# Patient Record
Sex: MALE | Race: WHITE
[De-identification: names, ages, dates, MRNs, and addresses within clinical notes are randomized per-mention and may not be internally consistent; named-entity substitution may affect disease eponyms.]

---

## 2017-04-03 ENCOUNTER — HOSPITAL ENCOUNTER (OUTPATIENT)
Dept: HOSPITAL 56 - MW.ED | Age: 47
Setting detail: OBSERVATION
LOS: 1 days | Discharge: HOME | End: 2017-04-04
Attending: INTERNAL MEDICINE | Admitting: INTERNAL MEDICINE
Payer: COMMERCIAL

## 2017-04-03 DIAGNOSIS — K21.9: ICD-10-CM

## 2017-04-03 DIAGNOSIS — K59.09: ICD-10-CM

## 2017-04-03 DIAGNOSIS — E78.00: ICD-10-CM

## 2017-04-03 DIAGNOSIS — Z79.82: ICD-10-CM

## 2017-04-03 DIAGNOSIS — I10: ICD-10-CM

## 2017-04-03 DIAGNOSIS — J45.901: Primary | ICD-10-CM

## 2017-04-03 DIAGNOSIS — F17.220: ICD-10-CM

## 2017-04-03 LAB
CHLORIDE SERPL-SCNC: 113 MMOL/L (ref 98–110)
SODIUM SERPL-SCNC: 142 MMOL/L (ref 136–146)

## 2017-04-03 PROCEDURE — 71020: CPT

## 2017-04-03 PROCEDURE — 84484 ASSAY OF TROPONIN QUANT: CPT

## 2017-04-03 PROCEDURE — 80048 BASIC METABOLIC PNL TOTAL CA: CPT

## 2017-04-03 PROCEDURE — 93005 ELECTROCARDIOGRAM TRACING: CPT

## 2017-04-03 PROCEDURE — 96361 HYDRATE IV INFUSION ADD-ON: CPT

## 2017-04-03 PROCEDURE — G0378 HOSPITAL OBSERVATION PER HR: HCPCS

## 2017-04-03 PROCEDURE — 96375 TX/PRO/DX INJ NEW DRUG ADDON: CPT

## 2017-04-03 PROCEDURE — 96376 TX/PRO/DX INJ SAME DRUG ADON: CPT

## 2017-04-03 PROCEDURE — 96365 THER/PROPH/DIAG IV INF INIT: CPT

## 2017-04-03 PROCEDURE — 94664 DEMO&/EVAL PT USE INHALER: CPT

## 2017-04-03 PROCEDURE — 99285 EMERGENCY DEPT VISIT HI MDM: CPT

## 2017-04-03 PROCEDURE — 80053 COMPREHEN METABOLIC PANEL: CPT

## 2017-04-03 PROCEDURE — 85025 COMPLETE CBC W/AUTO DIFF WBC: CPT

## 2017-04-03 PROCEDURE — 94640 AIRWAY INHALATION TREATMENT: CPT

## 2017-04-03 PROCEDURE — 36415 COLL VENOUS BLD VENIPUNCTURE: CPT

## 2017-04-03 RX ADMIN — FLUTICASONE PROPIONATE SCH SPRAY: 50 SPRAY, METERED NASAL at 21:01

## 2017-04-03 NOTE — PCM.HP
H&P History of Present Illness





- General


Date of Service: 04/03/17


Admit Problem/Dx: 


 Admission Diagnosis/Problem





Admission Diagnosis/Problem      Asthma without status asthmaticus








Source of Information: Patient, Family (Wife Erin at bedside)


History Limitations: Reports: No limitations





- History of Present Illness


Initial Comments - Free Text/Narative: 


This 46 year old male with pmh of asthma, environmental allergies, and HTN 

presented to the ED today with complaints of 4 day history of SOB, cough, and 

sinus congestion. It has progressively worsened and his wife, Erin, urged him 

to be evaluated. He denies fever, chills, chest pain or palpitations. No N/V or 

abdominal pain. He takes Singulair, Dulera, Albuterol inhaler PRN, and Flonase 

occasionally. He reports being in [place] has worsened his allergies significantly 

than home in the [facility]. His contract ends in June and is hoping to move back 

home. 





In the ED no leukocytosis noted, eosinophils elevated, 12.7. BMP WNL. CXR 

negative. He was noted to have mild hypoxia on RA, with wheezing throughout. He 

was treated with Duonebs, Solumedrol, and Magnesium IV. He will be admitted for 

asthma exacerbation and mild hypoxia.








- Related Data


Allergies/Adverse Reactions: 


 Allergies











Allergy/AdvReac Type Severity Reaction Status Date / Time


 


No Known Allergies Allergy   Verified 04/03/17 10:44











Home Medications: 


 Home Meds





Albuterol [Ventolin HFA] 2 inh INH BID 04/03/17 [History]


Albuterol/Ipratropium [DuoNeb 3.0-0.5 MG/3 ML] 1 ampule PO ASDIRECTED 04/03/17 [

History]


Losartan/Hydrochlorothiazide [Losartan-HCTZ 50-12.5 MG] 1 tab PO DAILY 04/03/17 

[History]


Mometasone/Formoterol [Dulera 200-5 MCG] 2 inh PO BID 04/03/17 [History]


Montelukast [Singulair] 1 tab PO DAILY 04/03/17 [History]











Past Medical History


Cardiovascular History: Reports: High cholesterol, Hypertension.  Denies: Afib, 

Blood clots/VTE/DVT, MI


Respiratory History: Reports: Asthma.  Denies: PE, Sleep apnea


Gastrointestinal History: Reports: Chronic constipation, GERD.  Denies: 

Gastritis, GI bleed


Endocrine/Metabolic History: Reports: Obesity/BMI 30+.  Denies: Diabetes, type 

II, Hypothyroidism





- Infectious Disease History


Infectious Disease History: Reports: Chicken pox





- Past Surgical History


Musculoskeletal Surgical History: Reports: Other (see below)


Other Musculoskeletal Surgeries/Procedures:: Right arm bicep muscle reattachment





Social & Family History





- Family History


Family Medical History: Noncontributory





- Tobacco Use


Smoking Status *Q: Never Smoker


Tobacco Use Within Last Twelve Months: Smokeless Tobacco


Packs/Tins Daily: 1 (1 tin per day)


Second Hand Smoke Exposure: Yes





- Tobacco Core Measures


Tobacco Use/Smoking Within Last 30 Days: Yes


Smoking Frequency Within Last 30 Days: Reports: None


Smokeless Tobacco Use in Last 30 Days: Yes


Smokeless Tobacco Use History: Chewing Tobacco





- Caffeine Use


Caffeine Use: Reports: Soda





- Alcohol Use


Alcohol Use Frequency: Socially





- Recreational Drug Use


Recreational Drug Use: No





- Living Situation & Occupation


Living situation: Reports: 


Occupation: employed





H&P Review of Systems





- Review of Systems:


Review Of Systems: See Below


General: Reports: no symptoms.  Denies: fever, chills, weakness


HEENT: Reports: sinus congestion, other (itchy ears)


Pulmonary: Reports: Shortness of Breath, Wheezing, Pleuritic Chest Pain, Cough.

  Denies: Sputum


Cardiovascular: Reports: no symptoms.  Denies: chest pain, palpitations, edema


Gastrointestinal: Reports: No symptoms, Constipation (chronic).  Denies: Black 

stool, Bloody stool, Nausea, Vomiting


Genitourinary: Reports: no symptoms.  Denies: dysuria, frequency, burning, pain


Musculoskeletal: Reports: no symptoms


Skin: Reports: no symptoms


Psychiatric: Reports: no symptoms


Neurological: Reports: No Symptoms


Hematologic/Lymphatic: Reports: no symptoms


Immunologic: Reports: no symptoms





Exam





- Exam


Exam: See Below





- Vital Signs


Vital Signs: 


 Last Vital Signs











Temp  98.2 F   04/03/17 14:15


 


Pulse  101 H  04/03/17 14:15


 


Resp  20   04/03/17 14:15


 


BP  145/92 H  04/03/17 14:15


 


Pulse Ox  92 L  04/03/17 14:15











Weight: 127.006 kg





- Exam


General: alert, oriented, cooperative


HEENT: Conjunctiva clear, EACs clear, EOMI, Hearing intact, Mucosa moist & pink

, Posterior pharynx clear.  No: Nares patent (sinus congestion)


Neck: supple, trachea midline, 2+ carotid pulse wo bruit


Lungs: Decreased breath sounds, Rhonchi, Wheezing


Cardiovascular: regular rate, regular rhythm, normal S1, normal S2


Abdomen: normal bowel sounds, soft.  No: organomegaly


Extremities: normal inspection, normal pulses


Neuro Extensive - Mental Status: alert, oriented x3, normal mood/affect, normal 

cognition


Psychiatric: alert, normal affect, normal mood





- Patient Data


Result Diagrams: 


 04/03/17 11:16





 04/03/17 11:16





*Q Meaningful Use (ADM)





- VTE *Q


VTE Criteria *Q: 








- VTE Risk Assess *Q


Each Risk Factor Represents 1 Point: Age 41 - 59 years


Total Score 1 Point Risk Factors: 1


Each Risk Factor Represents 2 Points: None


Total Score 2 Point Risk Factors: 0


Each Risk Factor Represents 3 Points: None


Total Score 3 Point Risk Factors: 0


Each Risk Factor Represents 5 Points: None


Total Score 5 Point Risk Factors: 0


Venous Thromboembolism Risk Factor Score *Q: 1





- Stroke *Q


Stroke Criteria *Q: 








- AMI *Q


AMI Criteria *Q: 








- Problem List


(1) Exacerbation of asthma


SNOMED Code(s): 841922686


   ICD Code: J45.901 - UNSPECIFIED ASTHMA WITH (ACUTE) EXACERBATION   Status: 

Acute   Current Visit: Yes   





(2) Environmental and seasonal allergies


SNOMED Code(s): 057445759, 933220094, 636246279


   ICD Code: J30.89 - OTHER ALLERGIC RHINITIS   Status: Acute   Current Visit: 

Yes   





(3) HTN (hypertension)


SNOMED Code(s): 72782581


   ICD Code: I10 - ESSENTIAL (PRIMARY) HYPERTENSION   Status: Chronic   Current 

Visit: Yes   


Qualifiers: 


   Hypertension type: essential hypertension   Qualified Code(s): I10 - 

Essential (primary) hypertension   


Problem List Initiated/Reviewed/Updated: Yes


Orders Last 24hrs: 


 Medication Orders





Sodium Chloride (Normal Saline)  250 mls @ 999 mls/hr IV STAT HENNA








Assessment/Plan Comment:: 





This 46 year old male admitted with asthma exacerbation with mild hypoxia





1. Asthma exacerbation iwth mild hypoxia: Continue IV Solu-medrol  mg 

Q8hrs. Duonebs, oxygen as needed. Continue Singulair and Flonase. Will add 

Loratadine. 





2. HTN: Continue Losartan/HCTZ





VTE: SCDs





Dispo: 1-2 days pending improvement.

## 2017-04-03 NOTE — EDM.PDOC
<Eddie Jo - Last Filed: 04/03/17 12:38>





ED HISTORY OF PRESENT ILLNESS





- General


Chief Complaint: Respiratory Problem


Stated Complaint: ASHMA


Time Seen by Provider: 04/03/17 10:53





- Related Data


Allergies/ADRs: 


 Allergies











Allergy/AdvReac Type Severity Reaction Status Date / Time


 


No Known Allergies Allergy   Verified 04/03/17 10:44











Home Meds: 


 Home Meds





Albuterol [Ventolin HFA] 2 inh INH BID 04/03/17 [History]


Albuterol/Ipratropium [DuoNeb 3.0-0.5 MG/3 ML] 1 ampule PO ASDIRECTED 04/03/17 [

History]


Losartan/Hydrochlorothiazide [Losartan-HCTZ 50-12.5 MG] 1 tab PO DAILY 04/03/17 

[History]


Mometasone/Formoterol [Dulera 200-5 MCG] 2 inh PO BID 04/03/17 [History]


Montelukast [Singulair] 1 tab PO DAILY 04/03/17 [History]











Course





- Vital Signs


Last Recorded V/S: 


 Last Vital Signs











Temp  36.5 C   04/03/17 11:41


 


Pulse  97   04/03/17 13:35


 


Resp  16   04/03/17 11:41


 


BP  150/72 H  04/03/17 13:35


 


Pulse Ox  88 L  04/03/17 13:35














- Orders/Labs/Meds


Orders: 


 Active Orders 24 hr











 Category Date Time Status


 


 RT Aerosol Therapy [RC] ASDIRECTED Care  04/03/17 11:40 Active


 


 Magnesium Sulfate/Water [Magnesium Sulfate 2 GM in Med  04/03/17 12:48 Active





 Water 50 ML] 2 gm   





 Premix Bag 1 bag   





 IV ONETIME   








 Medication Orders





Magnesium Sulfate 2 gm/ Premix  50 mls @ 25 mls/hr IV ONETIME ONE


   Stop: 04/03/17 14:47


   Last Admin: 04/03/17 13:01  Dose: 25 mls/hr








Labs: 


 Laboratory Tests











  04/03/17 04/03/17 Range/Units





  11:16 11:16 


 


WBC  7.09   (4.0-11.0)  K/uL


 


RBC  4.68   (4.50-5.90)  M/uL


 


Hgb  14.6   (13.0-17.0)  g/dL


 


Hct  42.8   (38.0-50.0)  %


 


MCV  91.5   (80.0-98.0)  fL


 


MCH  31.2   (27.0-32.0)  pg


 


MCHC  34.1   (31.0-37.0)  g/dL


 


RDW Std Deviation  47.0   (28.0-62.0)  fl


 


RDW Coeff of Walter  14   (11.0-15.0)  %


 


Plt Count  224   (150-400)  K/uL


 


MPV  9.60   (7.40-12.00)  fL


 


Neut % (Auto)  69.7   (48.0-80.0)  %


 


Lymph % (Auto)  12.7 L   (16.0-40.0)  %


 


Mono % (Auto)  3.8   (0.0-15.0)  %


 


Eos % (Auto)  12.7 H   (0.0-7.0)  %


 


Baso % (Auto)  1.1   (0.0-1.5)  %


 


Neut # (Auto)  4.9   (1.4-5.7)  K/uL


 


Lymph # (Auto)  0.9   (0.6-2.4)  K/uL


 


Mono # (Auto)  0.3   (0.0-0.8)  K/uL


 


Eos # (Auto)  0.9 H   (0.0-0.7)  K/uL


 


Baso # (Auto)  0.1   (0.0-0.1)  K/uL


 


Nucleated RBC %  0.0   /100WBC


 


Nucleated RBCs #  0   K/uL


 


Sodium   142  (136-146)  mmol/L


 


Potassium   4.2  (3.5-5.1)  mmol/L


 


Chloride   113 H  ()  mmol/L


 


Carbon Dioxide   19 L  (21-31)  mmol/L


 


BUN   22  (6.0-23.0)  mg/dL


 


Creatinine   1.1  (0.6-1.5)  mg/dL


 


Est Cr Clr Drug Dosing   88.42  mL/min


 


Estimated GFR (MDRD)   > 60.0  ml/min


 


Glucose   104  ()  mg/dL


 


Calcium   8.8  (8.8-10.8)  mg/dL


 


Total Bilirubin   0.9  (0.1-1.5)  mg/dL


 


AST   29  (5-40)  IU/L


 


ALT   66 H  (8-54)  IU/L


 


Alkaline Phosphatase   83  ()  


 


Total Protein   6.8  (6.0-8.0)  g/dL


 


Albumin   4.1  (3.5-5.0)  g/dL


 


Globulin   2.7  (2.0-3.5)  g/dL


 


Albumin/Globulin Ratio   1.5  (1.3-2.8)  











Meds: 


Medications











Generic Name Dose Route Start Last Admin





  Trade Name Freq  PRN Reason Stop Dose Admin


 


Magnesium Sulfate 2 gm/ Premix  50 mls @ 25 mls/hr  04/03/17 12:48  04/03/17 13:

01





  IV  04/03/17 14:47  25 mls/hr





  ONETIME ONE   Administration














Discontinued Medications














Generic Name Dose Route Start Last Admin





  Trade Name Freq  PRN Reason Stop Dose Admin


 


Sodium Chloride  1,000 mls @ 999 mls/hr  04/03/17 10:52  04/03/17 11:08





  Normal Saline  IV  04/03/17 11:52  999 mls/hr





  STAT ONE   Administration


 


Methylprednisolone Sodium Succinate  125 mg  04/03/17 10:52  04/03/17 11:07





  Solu-Medrol  IVPUSH  04/03/17 10:53  125 mg





  ONETIME ONE   Administration


 


Racepinephrine  0.5 ml  04/03/17 11:40  04/03/17 11:46





  S-2 2.25%  NEB  04/03/17 11:41  0.5 ml





  ONETIME ONE   Administration














Departure





- Departure


Disposition: Home, Self-Care 01


Clinical Impression: 


 Exacerbation of asthma





Instructions:  Asthma, Adult


Referrals: 


PCP,None [Primary Care Provider] - 


Forms:  ED Department Discharge


Additional Instructions: 


The following information is given to patients seen in the emergency department 

who are being discharged to home. This information is to outline your options 

for follow-up care. We provide all patients seen in our emergency department 

with a follow-up referral.





The need for follow-up, as well as the timing and circumstances, are variable 

depending upon the specifics of your emergency department visit.





If you don't have a primary care physician on staff, we will provide you with a 

referral. We always advise you to contact your personal physician following an 

emergency department visit to inform them of the circumstance of the visit and 

for follow-up with them and/or the need for any referrals to a consulting 

specialist.





The emergency department will also refer you to a specialist when appropriate. 

This referral assures that you have the opportunity for follow-up care with a 

specialist. All of these measure are taken in an effort to provide you with 

optimal care, which includes your follow-up.





Under all circumstances we always encourage you to contact your private 

physician who remains a resource for coordinating your care. When calling for 

follow-up care, please make the office aware that this follow-up is from your 

recent emergency room visit. If for any reason you are refused follow-up, 

please contact the [facility] Emergency 

Department at [phone] and asked to speak to the emergency department 

charge nurse.





Take your routine asthma medicine as directed





Followup with your primary care provider one to 2 days








There will be benefit for a pulmonology workup as well as an allergist








Return to ED as needed as discussed





- My Orders


Last 24 Hours: 


My Active Orders





04/03/17 11:40


RT Aerosol Therapy [RC] ASDIRECTED 














- Assessment/Plan


Last 24 Hours: 


My Active Orders





04/03/17 11:40


RT Aerosol Therapy [RC] ASDIRECTED 














<Leroy Maloney - Last Filed: 04/03/17 13:59>





ED HISTORY OF PRESENT ILLNESS





- General


Source of Information: Reports: Patient, Family


History Limitations: Reports: No limitations





- History of Present Illness


INITIAL COMMENTS - FREE TEXT/NARRATIVE: 


History of present illness:


[51-year-old male presenting with acute onset of shortness of breath. Patient 

has a long-term history of asthma with a reactive airway component and was 

previously seen at [facility] clinic with insufficient relief. Provider at [facility] 

called and requested we evaluate and treat patient further due to their limited 

resources.]





Review of systems: 


As per history of present illness and below otherwise all systems reviewed and 

negative.





Past medical history: 


As per history of present illness and as reviewed below otherwise 

noncontributory.





Surgical history: 


As per history of present illness and as reviewed below otherwise 

noncontributory.





Social history: 


No reported history of drug or alcohol abuse.





Family history: 


As per history of present illness and as reviewed below otherwise 

noncontributory.





Physical exam:


HEENT: Atraumatic, normocephalic, pupils reactive, negative for conjunctival 

pallor or scleral icterus, mucous membranes moist, throat clear, neck supple, 

nontender, trachea midline.


Lungs: Clear to auscultation, breath sounds equal bilaterally, chest nontender.


Heart: S1S2, regular, negative for clicks, rubs, or JVD.


Abdomen: Soft, nondistended, nontender. Negative for masses or 

hepatosplenomegaly. Negative for costovertebral tenderness.


Pelvis: Stable nontender.


Genitourinary: Deferred.


Rectal: Deferred.


Extremities: Atraumatic, negative for cords or calf pain. Neurovascular 

unremarkable.


Neuro: Awake, alert, oriented. Cranial nerves II through XII unremarkable. 

Cerebellum unremarkable. Motor and sensory unremarkable throughout. Exam 

nonfocal.





Diagnostics:


[Chest x-ray, CBC, CMP]





Therapeutics:


[Syncope, magnesium, IV fluid, some lateral]





Impression: 


[Asthma exacerbation]





Plan:


[Continue on asthma meds]





Definitive disposition and diagnosis as appropriate pending reevaluation and 

review of above.











ED ROS GENERAL





- Review of Systems


Review Of Systems: See Below (History of present illness)





ED EXAM, GENERAL





- Physical Exam


Exam: See Below (See history of present illness)





Course





- Vital Signs


Last Recorded V/S: 


 Last Vital Signs











Temp  36.5 C   04/03/17 11:41


 


Pulse  97   04/03/17 13:35


 


Resp  16   04/03/17 11:41


 


BP  150/72 H  04/03/17 13:35


 


Pulse Ox  88 L  04/03/17 13:35














- Orders/Labs/Meds


Labs: 


 Laboratory Tests











  04/03/17 04/03/17 Range/Units





  11:16 11:16 


 


WBC  7.09   (4.0-11.0)  K/uL


 


RBC  4.68   (4.50-5.90)  M/uL


 


Hgb  14.6   (13.0-17.0)  g/dL


 


Hct  42.8   (38.0-50.0)  %


 


MCV  91.5   (80.0-98.0)  fL


 


MCH  31.2   (27.0-32.0)  pg


 


MCHC  34.1   (31.0-37.0)  g/dL


 


RDW Std Deviation  47.0   (28.0-62.0)  fl


 


RDW Coeff of Walter  14   (11.0-15.0)  %


 


Plt Count  224   (150-400)  K/uL


 


MPV  9.60   (7.40-12.00)  fL


 


Neut % (Auto)  69.7   (48.0-80.0)  %


 


Lymph % (Auto)  12.7 L   (16.0-40.0)  %


 


Mono % (Auto)  3.8   (0.0-15.0)  %


 


Eos % (Auto)  12.7 H   (0.0-7.0)  %


 


Baso % (Auto)  1.1   (0.0-1.5)  %


 


Neut # (Auto)  4.9   (1.4-5.7)  K/uL


 


Lymph # (Auto)  0.9   (0.6-2.4)  K/uL


 


Mono # (Auto)  0.3   (0.0-0.8)  K/uL


 


Eos # (Auto)  0.9 H   (0.0-0.7)  K/uL


 


Baso # (Auto)  0.1   (0.0-0.1)  K/uL


 


Nucleated RBC %  0.0   /100WBC


 


Nucleated RBCs #  0   K/uL


 


Sodium   142  (136-146)  mmol/L


 


Potassium   4.2  (3.5-5.1)  mmol/L


 


Chloride   113 H  ()  mmol/L


 


Carbon Dioxide   19 L  (21-31)  mmol/L


 


BUN   22  (6.0-23.0)  mg/dL


 


Creatinine   1.1  (0.6-1.5)  mg/dL


 


Est Cr Clr Drug Dosing   88.42  mL/min


 


Estimated GFR (MDRD)   > 60.0  ml/min


 


Glucose   104  ()  mg/dL


 


Calcium   8.8  (8.8-10.8)  mg/dL


 


Total Bilirubin   0.9  (0.1-1.5)  mg/dL


 


AST   29  (5-40)  IU/L


 


ALT   66 H  (8-54)  IU/L


 


Alkaline Phosphatase   83  ()  


 


Total Protein   6.8  (6.0-8.0)  g/dL


 


Albumin   4.1  (3.5-5.0)  g/dL


 


Globulin   2.7  (2.0-3.5)  g/dL


 


Albumin/Globulin Ratio   1.5  (1.3-2.8)  











Meds: 


Medications











Generic Name Dose Route Start Last Admin





  Trade Name Freq  PRN Reason Stop Dose Admin


 


Magnesium Sulfate 2 gm/ Premix  50 mls @ 25 mls/hr  04/03/17 12:48  04/03/17 13:

01





  IV  04/03/17 14:47  25 mls/hr





  ONETIME ONE   Administration














Discontinued Medications














Generic Name Dose Route Start Last Admin





  Trade Name Freq  PRN Reason Stop Dose Admin


 


Sodium Chloride  1,000 mls @ 999 mls/hr  04/03/17 10:52  04/03/17 11:08





  Normal Saline  IV  04/03/17 11:52  999 mls/hr





  STAT ONE   Administration


 


Methylprednisolone Sodium Succinate  125 mg  04/03/17 10:52  04/03/17 11:07





  Solu-Medrol  IVPUSH  04/03/17 10:53  125 mg





  ONETIME ONE   Administration


 


Racepinephrine  0.5 ml  04/03/17 11:40  04/03/17 11:46





  S-2 2.25%  NEB  04/03/17 11:41  0.5 ml





  ONETIME ONE   Administration














Departure





- Departure


Time of Disposition: 13:56


Condition: good

## 2017-04-03 NOTE — CR
EXAMINATION: Two-view chest (PA and Lateral views).

 

HISTORY: Shortness of breath.

 

FINDINGS: 

The trachea is midline. The cardiomediastinal silhouette is within normal limits. No pulmonary infil
trates, effusions or pneumothorax.

 

Osseous structures appear unremarkable.

 

IMPRESSION: 

No acute cardiopulmonary process.

## 2017-04-04 VITALS — DIASTOLIC BLOOD PRESSURE: 78 MMHG | SYSTOLIC BLOOD PRESSURE: 154 MMHG

## 2017-04-04 LAB
CHLORIDE SERPL-SCNC: 111 MMOL/L (ref 98–110)
SODIUM SERPL-SCNC: 140 MMOL/L (ref 136–146)

## 2017-04-04 RX ADMIN — FLUTICASONE PROPIONATE SCH SPRAY: 50 SPRAY, METERED NASAL at 08:34

## 2017-04-04 NOTE — PCM.PN
- General Info


Date of Service: 04/04/17


Admission Dx/Problem (Free Text): 


 Admission Diagnosis/Problem





Admission Diagnosis/Problem      Asthma without status asthmaticus











- Patient Data


Vitals - most recent: 


 Last Vital Signs











Temp  97.4 F   04/04/17 04:00


 


Pulse  111 H  04/04/17 00:28


 


Resp  20   04/04/17 04:00


 


BP  141/84 H  04/04/17 04:00


 


Pulse Ox  90 L  04/04/17 04:00











Weight - most recent: 127.006 kg


I&O - last 24 hours: 


 Intake & Output











 04/03/17 04/04/17 04/04/17





 22:59 06:59 14:59


 


Intake Total  200 


 


Output Total  3 


 


Balance  197 











Lab Results last 24 hrs: 


 Laboratory Results - last 24 hr











  04/04/17 Range/Units





  04:49 


 


Sodium  140  (136-146)  mmol/L


 


Potassium  4.7  (3.5-5.1)  mmol/L


 


Chloride  111 H  ()  mmol/L


 


Carbon Dioxide  18 L  (21-31)  mmol/L


 


BUN  19  (6.0-23.0)  mg/dL


 


Creatinine  1.1  (0.6-1.5)  mg/dL


 


Est Cr Clr Drug Dosing  88.06  mL/min


 


Estimated GFR (MDRD)  > 60.0  ml/min


 


Glucose  130 H  ()  mg/dL


 


Calcium  9.5  (8.8-10.8)  mg/dL











Med Orders - Current: 


 Current Medications





Acetaminophen (Tylenol)  650 mg PO Q4H PRN


   PRN Reason: Pain


Albuterol (Proventil Neb Soln)  2.5 mg NEB Q2H PRN


   PRN Reason: Shortness Of Breath/wheezing


Albuterol/Ipratropium (Duoneb 3.0-0.5 Mg/3 Ml)  3 ml NEB Q4HRRT [facility]


   Last Admin: 04/04/17 05:59 Dose:  Not Given


Fluticasone Propionate (Flonase)  0 gm NASBOTH BID [facility]


   Last Admin: 04/03/17 21:01 Dose:  1 spray


HCTZ/Losartan Potassium (Hyzaar 50-12.5 Mg)  1 tab PO BEDTIME [facility]


   Last Admin: 04/03/17 21:01 Dose:  1 tab


Sodium Chloride (Normal Saline)  250 mls @ 999 mls/hr IV STAT HENNA


Loratadine (Claritin)  10 mg PO DAILY [facility]


   Last Admin: 04/03/17 17:07 Dose:  10 mg


Methylprednisolone Sodium Succinate (Solu-Medrol)  60 mg IVPUSH Q12H [facility]


   Last Admin: 04/03/17 22:44 Dose:  60 mg


Montelukast Sodium (Singulair)  10 mg PO DAILY [facility]


Dulera 100 Mcg/ 5mcg (**Own Med**)  0 each PO BID [facility]


Ondansetron HCl (Zofran)  4 mg IVPUSH Q4H PRN


   PRN Reason: Nausea





Discontinued Medications





Albuterol/Ipratropium (Duoneb 3.0-0.5 Mg/3 Ml)  3 ml NEB ONETIME ONE


   Stop: 04/03/17 14:13


   Last Admin: 04/03/17 14:20 Dose:  3 ml


Sodium Chloride (Normal Saline)  1,000 mls @ 999 mls/hr IV STAT ONE


   Stop: 04/03/17 11:52


   Last Admin: 04/03/17 15:13 Dose:  999 mls/hr


Magnesium Sulfate 2 gm/ Premix  50 mls @ 25 mls/hr IV ONETIME ONE


   Stop: 04/03/17 14:47


   Last Admin: 04/03/17 13:01 Dose:  25 mls/hr


Lorazepam (Ativan)  1 mg PO ONETIME ONE


   Stop: 04/03/17 21:01


   Last Admin: 04/03/17 21:02 Dose:  1 mg


Methylprednisolone Sodium Succinate (Solu-Medrol)  125 mg IVPUSH ONETIME ONE


   Stop: 04/03/17 10:53


   Last Admin: 04/03/17 11:07 Dose:  125 mg


Methylprednisolone Sodium Succinate (Solu-Medrol)  125 mg IVPUSH Q8H [facility]


Dulera (Mometasone/ (Formoterol) 200-5mcg)  2 each PO BID [facility]


   Last Admin: 04/04/17 02:13 Dose:  Not Given


Racepinephrine (S-2 2.25%)  0.5 ml NEB ONETIME ONE


   Stop: 04/03/17 11:41


   Last Admin: 04/03/17 11:46 Dose:  0.5 ml











- Problem List & Annotations


(1) Exacerbation of asthma


SNOMED Code(s): 869172109


   Code(s): J45.901 - UNSPECIFIED ASTHMA WITH (ACUTE) EXACERBATION   Status: 

Acute   Current Visit: Yes   





(2) Environmental and seasonal allergies


SNOMED Code(s): 650607166, 103590059, 750707618


   Code(s): J30.89 - OTHER ALLERGIC RHINITIS   Status: Acute   Current Visit: 

Yes   





(3) HTN (hypertension)


SNOMED Code(s): 90938340


   Code(s): I10 - ESSENTIAL (PRIMARY) HYPERTENSION   Status: Chronic   Current 

Visit: Yes   


Qualifiers: 


   Hypertension type: essential hypertension   Qualified Code(s): I10 - 

Essential (primary) hypertension   





- My Orders


Last 24 Hours: 


My Active Orders





04/03/17 16:12


Intake and Output [RC] Q12H 


May Shower [RC] ASDIRECTED 


Oxygen Therapy [RC] PRN 


Up ad Migdalia [RC] ASDIRECTED 


Vital Signs [RC] Q4H 


Acetaminophen [Tylenol]   650 mg PO Q4H PRN 


Albuterol [Proventil Neb Soln]   2.5 mg NEB Q2H PRN 


Ondansetron [Zofran]   4 mg IVPUSH Q4H PRN 


Sequential Compression Device [OM.PC] Per Unit Routine 


Resuscitation Status Routine 





04/03/17 16:13


Antiembolic Devices [RC] PER UNIT ROUTINE 





04/03/17 16:14


RT Aerosol Therapy [RC] ASDIRECTED 





04/03/17 16:15


Loratadine [Claritin]   10 mg PO DAILY 





04/03/17 18:00


Albuterol/Ipratropium [DuoNeb 3.0-0.5 MG/3 ML]   3 ml NEB Q4HRRT 





04/03/17 21:00


Fluticasone Propionate [Flonase]   0 gm NASBOTH BID 


Hydrochlorothiazide/Losartan [Hyzaar 50-12.5 MG]   1 tab PO BEDTIME 





04/03/17 22:00


methylPREDNISolone Sod Succ [Solu-MEDROL]   60 mg IVPUSH Q12H 





04/03/17 Dinner


Regular Diet [DIET] 





04/04/17 09:00


Montelukast [Singulair]   10 mg PO DAILY 


Non-Formulary Medication [NF Drug]   0 each PO BID 














- Plan


Plan:: 





This 46 year old male admitted with asthma exacerbation with mild hypoxia





1. Asthma exacerbation iwth mild hypoxia: Continue IV Solu-medrol  mg 

Q8hrs. Duonebs, oxygen as needed. Continue Singulair and Flonase. Will add 

Loratadine. 





2. HTN: Continue Losartan/HCTZ





VTE: SCDs





Dispo: 1-2 days pending improvement.

## 2017-04-04 NOTE — PCM.DCSUM1
**Discharge Summary





- Hospital Course


Brief History: This 46 year old male with pmh of asthma, environmental allergies

, and HTN presented to the ED 4/3/2017 with complaints of 4 day history of SOB, 

cough, and sinus congestion. It has progressively worsened and his wife, Erin, 

urged him to be evaluated, he was seen at local urgent care who referred to the 

ED for further evaluation. He denies fever, chills, chest pain or palpitations. 

No N/V or abdominal pain. He takes Singulair, Dulera, Albuterol inhaler PRN, 

and Flonase occasionally. He [place] being in [place] 

[place] and is hoping 

to move back home.  In the ED no leukocytosis noted, eosinophils elevated, 

12.7. BMP WNL. CXR negative. He was noted to have mild hypoxia on RA, with 

wheezing throughout. He was treated with Duonebs, Solumedrol, and Magnesium IV. 

He will be admitted for asthma exacerbation and mild hypoxia.





- Discharge Data


Discharge Date: 04/04/17


Discharge Disposition: Home, Self-Care 01


Condition: Good





- Discharge Diagnosis/Problem(s)


(1) Neck strain


SNOMED Code(s): 939228476


   ICD Code: S16.1XXA - STRAIN OF MUSCLE, FASCIA AND TENDON AT NECK LEVEL, INIT

   Status: Acute   Current Visit: No   


Qualifiers: 


   Encounter type: initial encounter   Qualified Code(s): S16.1XXA - Strain of 

muscle, fascia and tendon at neck level, initial encounter   





(2) HTN (hypertension)


SNOMED Code(s): 63718589


   Status: Chronic   Current Visit: Yes   





(3) Exacerbation of asthma


SNOMED Code(s): 488208453


   Status: Acute   Current Visit: Yes   





- Patient Instructions


Diet: Regular Diet as Tolerated





- Discharge Plan


Prescriptions/Med Rec: 


predniSONE 20 mg PO WITHBREAKFAST #33 tablet


Home Medications: 


 Home Meds





Albuterol [Ventolin 2 MG/5 ML]  09/06/16 [History]


Aspirin 81 mg PO DAILY 09/06/16 [History]


Mometasone/Formoterol [Dulera 200-5 MCG] 2 puff IH BIDRT 09/06/16 [History]


Albuterol [Ventolin HFA]  12/10/16 [History]


Albuterol [Ventolin HFA] 2 inh INH BID 04/03/17 [History]


Albuterol/Ipratropium [DuoNeb 3.0-0.5 MG/3 ML] 1 ampule PO ASDIRECTED 04/03/17 [

History]


Losartan/Hydrochlorothiazide [Losartan-HCTZ 50-12.5 MG] 1 tab PO DAILY 04/03/17 

[History]


Montelukast [Singulair] 1 tab PO DAILY 04/03/17 [History]


Fluticasone Propionate [Flonase] 0 gm NASBOTH BID  bottle 04/04/17 [Rx]


Loratadine [Claritin] 10 mg PO DAILY #0 tablet 04/04/17 [Rx]


predniSONE 20 mg PO WITHBREAKFAST #33 tablet 04/04/17 [Rx]








Patient Handouts:  Asthma, Adult, Prednisone tablets, Hypertension


Referrals: 


Richie Hurley MD [Physician] - 04/11/17 10:00 am





- Discharge Summary/Plan Comment


DC Time >30 min.: No


Discharge Summary/Plan Comment: 





Discharge diagnoses:





Acute asthma exacerbation


Seasonal allergies








Brigido was treated with Solu-medrol 60 mg IV q12h for asthma exacerbation, as 

well as Loratadine, Flonase and Duonebs. This exacerbation is likely caused 

from seasonal allergies. Today he is off oxygen and sating 94% on RA with and 

without activity. He is feeling much better and is requesting discharge home. I 

will discharge him home today with continuing home inhalers, Dulera and 

albuterol nebulizers and inhaler PRN, SIngulair daily. I have encouraged him to 

use Flonase more regularly along with Loratadine. I will prescribed a long (2 

weeks) steroid taper due to severity of exacerbation and mild hypoxia 

associated with it. He is to follow up with PCP in 1 week. He is to return to 

ED or clinic if any concerns should arise.  





- General Info


Date of Service: 04/04/17


Admission Dx/Problem (Free Text: 


 Admission Diagnosis/Problem





Admission Diagnosis/Problem      Asthma without status asthmaticus








Subjective Update: 


Feeling much better today, Breathing has improving and is less SOB. Still feels 

like deep down he isn't opening up but feels 80% better compared to yesterday. 

Wife reports similar feelings. He is removed off oygen this am, sating 94% on 

RA and ambulated with no decreased in oxygen. Requesting discharge this 

morning. 





Functional Status: Reports: pain controlled, tolerating diet, ambulating, 

urinating





- Review of Systems


General: Reports: No Symptoms.  Denies: Fever


HEENT: Reports: sinus congestion, other (ear itchiness has ).  Denies: sore 

throat


Pulmonary: Reports: shortness of breath (improved.), cough (dry cough 

intermittently).  Denies: sputum


Cardiovascular: Reports: No Symptoms.  Denies: Chest Pain, Palpitations, Edema


Gastrointestinal: Reports: No symptoms.  Denies: Abdominal pain, Nausea, 

Vomiting


Musculoskeletal: Reports: no symptoms


Skin: Reports: no symptoms


Neurological: Reports: No Symptoms


Psychiatric: Reports: no symptoms





- Patient Data


Vitals - Most Recent: 


 Last Vital Signs











Temp  98.5 F   04/04/17 07:59


 


Pulse  111 H  04/04/17 00:28


 


Resp  20   04/04/17 07:59


 


BP  154/78 H  04/04/17 07:59


 


Pulse Ox  93 L  04/04/17 07:59











Weight - Most Recent: 127.006 kg


I&O - Last 24 hours: 


 Intake & Output











 04/03/17 04/04/17 04/04/17





 22:59 06:59 14:59


 


Intake Total  200 


 


Output Total  3 


 


Balance  197 











Lab Results - Last 24 hrs: 


 Laboratory Results - last 24 hr











  04/04/17 Range/Units





  04:49 


 


Sodium  140  (136-146)  mmol/L


 


Potassium  4.7  (3.5-5.1)  mmol/L


 


Chloride  111 H  ()  mmol/L


 


Carbon Dioxide  18 L  (21-31)  mmol/L


 


BUN  19  (6.0-23.0)  mg/dL


 


Creatinine  1.1  (0.6-1.5)  mg/dL


 


Est Cr Clr Drug Dosing  88.06  mL/min


 


Estimated GFR (MDRD)  > 60.0  ml/min


 


Glucose  130 H  ()  mg/dL


 


Calcium  9.5  (8.8-10.8)  mg/dL











Med Orders - Current: 


 Current Medications





Acetaminophen (Tylenol)  650 mg PO Q4H PRN


   PRN Reason: Pain


Albuterol (Proventil Neb Soln)  2.5 mg NEB Q2H PRN


   PRN Reason: Shortness Of Breath/wheezing


Albuterol/Ipratropium (Duoneb 3.0-0.5 Mg/3 Ml)  3 ml NEB Q4HRRT HENNA


   Last Admin: 04/04/17 05:59 Dose:  Not Given


Fluticasone Propionate (Flonase)  0 gm NASBOTH BID [facility]


   Last Admin: 04/04/17 08:34 Dose:  1 spray


HCTZ/Losartan Potassium (Hyzaar 50-12.5 Mg)  1 tab PO BEDTIME [facility]


   Last Admin: 04/03/17 21:01 Dose:  1 tab


Sodium Chloride (Normal Saline)  250 mls @ 999 mls/hr IV STAT [facility]


Loratadine (Claritin)  10 mg PO DAILY [facility]


   Last Admin: 04/04/17 08:34 Dose:  10 mg


Methylprednisolone Sodium Succinate (Solu-Medrol)  80 mg IVPUSH Q12H [facility]


Montelukast Sodium (Singulair)  10 mg PO DAILY [facility]


   Last Admin: 04/04/17 08:34 Dose:  10 mg


Dulera 100 Mcg/ 5mcg (**Own Med**)  0 each PO BID [facility]


   Last Admin: 04/04/17 08:35 Dose:  2 each


Ondansetron HCl (Zofran)  4 mg IVPUSH Q4H PRN


   PRN Reason: Nausea





Discontinued Medications





Albuterol/Ipratropium (Duoneb 3.0-0.5 Mg/3 Ml)  3 ml NEB ONETIME ONE


   Stop: 04/03/17 14:13


   Last Admin: 04/03/17 14:20 Dose:  3 ml


Sodium Chloride (Normal Saline)  1,000 mls @ 999 mls/hr IV STAT ONE


   Stop: 04/03/17 11:52


   Last Admin: 04/03/17 15:13 Dose:  999 mls/hr


Magnesium Sulfate 2 gm/ Premix  50 mls @ 25 mls/hr IV ONETIME ONE


   Stop: 04/03/17 14:47


   Last Admin: 04/03/17 13:01 Dose:  25 mls/hr


Lorazepam (Ativan)  1 mg PO ONETIME ONE


   Stop: 04/03/17 21:01


   Last Admin: 04/03/17 21:02 Dose:  1 mg


Methylprednisolone Sodium Succinate (Solu-Medrol)  125 mg IVPUSH ONETIME ONE


   Stop: 04/03/17 10:53


   Last Admin: 04/03/17 11:07 Dose:  125 mg


Methylprednisolone Sodium Succinate (Solu-Medrol)  125 mg IVPUSH Q8H [facility]


Methylprednisolone Sodium Succinate (Solu-Medrol)  60 mg IVPUSH Q12H [facility]


   Last Admin: 04/03/17 22:44 Dose:  60 mg


Dulera (Mometasone/ (Formoterol) 200-5mcg)  2 each PO BID [facility]


   Last Admin: 04/04/17 02:13 Dose:  Not Given


Racepinephrine (S-2 2.25%)  0.5 ml NEB ONETIME ONE


   Stop: 04/03/17 11:41


   Last Admin: 04/03/17 11:46 Dose:  0.5 ml











- Exam


Quality Assessment: Reports: DVT prophylaxis.  Denies: supplemental oxygen


General: Reports: alert, oriented, cooperative


HEENT: Reports: Pupils equal, Pupils reactive, EOMI, Mucous membr. moist/pink


Neck: Reports: supple


Lungs: Reports: Clear to auscultation, Normal respiratory effort, Decreased 

breath sounds (to bilateral bases).  Denies: Wheezing


Cardiovascular: Reports: Regular Rate, Regular Rhythm


Abdomen: Reports: bowel sounds present, soft, no tenderness, no distension


Extremities: Reports: no edema, normal pulses





*Q Meaningful Use (DIS)





- VTE *Q


VTE Criteria *Q: 








- Stroke *Q


Stroke Criteria *Q: 








- AMI *Q


AMI Criteria *Q:

## 2017-07-19 ENCOUNTER — HOSPITAL ENCOUNTER (EMERGENCY)
Dept: HOSPITAL 56 - MW.ED | Age: 47
Discharge: HOME | End: 2017-07-19
Payer: COMMERCIAL

## 2017-07-19 VITALS — SYSTOLIC BLOOD PRESSURE: 149 MMHG | DIASTOLIC BLOOD PRESSURE: 80 MMHG

## 2017-07-19 DIAGNOSIS — J45.909: ICD-10-CM

## 2017-07-19 DIAGNOSIS — Z79.899: ICD-10-CM

## 2017-07-19 DIAGNOSIS — E66.9: ICD-10-CM

## 2017-07-19 DIAGNOSIS — Z79.82: ICD-10-CM

## 2017-07-19 DIAGNOSIS — L03.115: Primary | ICD-10-CM

## 2017-07-19 DIAGNOSIS — Z98.890: ICD-10-CM

## 2017-07-19 DIAGNOSIS — E78.00: ICD-10-CM

## 2017-07-19 DIAGNOSIS — I10: ICD-10-CM

## 2017-07-19 DIAGNOSIS — K21.9: ICD-10-CM

## 2017-07-19 PROCEDURE — 96372 THER/PROPH/DIAG INJ SC/IM: CPT

## 2017-07-19 PROCEDURE — 84550 ASSAY OF BLOOD/URIC ACID: CPT

## 2017-07-19 PROCEDURE — 36415 COLL VENOUS BLD VENIPUNCTURE: CPT

## 2017-07-19 PROCEDURE — 99283 EMERGENCY DEPT VISIT LOW MDM: CPT

## 2017-07-19 PROCEDURE — 85025 COMPLETE CBC W/AUTO DIFF WBC: CPT

## 2017-07-19 PROCEDURE — 85652 RBC SED RATE AUTOMATED: CPT

## 2017-07-19 PROCEDURE — 86140 C-REACTIVE PROTEIN: CPT

## 2017-07-19 PROCEDURE — 73610 X-RAY EXAM OF ANKLE: CPT

## 2017-07-19 NOTE — EDM.PDOC
ED HPI GENERAL MEDICAL PROBLEM





- General


Chief Complaint: General


Stated Complaint: CAN'T WALK


Time Seen by Provider: 07/19/17 14:41


Source of Information: Reports: Patient


History Limitations: Reports: No Limitations





- History of Present Illness


INITIAL COMMENTS - FREE TEXT/NARRATIVE: 


History of present illness:


[]Patient was sitting at his desk for about 18 hours and started feeling pain 

in his right ankle. He denies any trauma, fevers but has chills. He denies any 

leg pain, shortness of breath or chest pain. Patient has a history of gout but 

states it doesn't feel like a typical gout attack.





Review of systems: 


As per history of present illness and below otherwise all systems reviewed and 

negative.





Past medical history: 


As per history of present illness and as reviewed below otherwise 

noncontributory.





Surgical history: 


As per history of present illness and as reviewed below otherwise 

noncontributory.





Social history: 


No reported history of drug or alcohol abuse.





Family history: 


As per history of present illness and as reviewed below otherwise 

noncontributory.





Physical exam:


General: Well developed, well nourished in NAD


HEENT: Atraumatic, normocephalic, pupils reactive, negative for conjunctival 

pallor or scleral icterus, mucous membranes moist, throat clear, neck supple, 

nontender, trachea midline.


Lungs: Clear to auscultation, breath sounds equal bilaterally, chest nontender.


Heart: S1S2, regular, negative for clicks, rubs, or JVD.


Abdomen: Soft, nondistended, nontender. Negative for masses or 

hepatosplenomegaly. Negative for costovertebral tenderness.


Pelvis: Stable nontender.


Genitourinary: Deferred.


Rectal: Deferred.


Extremities: Atraumatic, negative for cords or calf pain. Neurovascular 

unremarkable.


Neuro: Awake, alert, oriented. Cranial nerves II through XII unremarkable. 

Cerebellum unremarkable. Motor and sensory unremarkable throughout. Exam 

nonfocal.





Diagnostics:


[]CBC is mildly elevated uric acid is negative ESR and CRP are also elevated. X-

rays negative





Therapeutics:


[]Rocephin given here in the ED and Toradol for pain





Impression: 


[]Cellulitis right foot and ankle





Plan:


[]Keflex 4 times a day for 10 days tramadol for pain, follow-up with PMD in 1-2 

days for recheck.





Definitive disposition and diagnosis as appropriate pending reevaluation and 

review of above.





  ** right foot


Pain Score (Numeric/FACES): 9





- Related Data


 Allergies











Allergy/AdvReac Type Severity Reaction Status Date / Time


 


No Known Allergies Allergy   Verified 07/19/17 14:57











Home Meds: 


 Home Meds





Albuterol [Ventolin 2 MG/5 ML]  09/06/16 [History]


Aspirin 81 mg PO DAILY 09/06/16 [History]


Mometasone/Formoterol [Dulera 200-5 MCG] 2 puff IH BIDRT 09/06/16 [History]


Albuterol [Ventolin HFA]  12/10/16 [History]


Albuterol [Ventolin HFA] 2 inh INH BID 04/03/17 [History]


Albuterol/Ipratropium [DuoNeb 3.0-0.5 MG/3 ML] 1 ampule PO ASDIRECTED 04/03/17 [

History]


Montelukast [Singulair] 1 tab PO DAILY 04/03/17 [History]


Fluticasone Propionate [Flonase] 0 gm NASBOTH BID  bottle 04/04/17 [Rx]


Loratadine [Claritin] 10 mg PO DAILY #0 tablet 04/04/17 [Rx]


Cephalexin [Keflex] 500 mg PO Q6HR #40 cap 07/19/17 [Rx]


Losartan [Cozaar] 100 mg PO DAILY 07/19/17 [History]


traMADol [Ultram] 50 mg PO Q8H PRN #12 tablet 07/19/17 [Rx]











Past Medical History


HEENT History: Reports: Impaired Vision


Other HEENT History: Patient reports farsightedness


Cardiovascular History: Reports: High Cholesterol, Hypertension


Respiratory History: Reports: Asthma.  Denies: PE, Sleep Apnea


Gastrointestinal History: Reports: Chronic Constipation, GERD, None


Genitourinary History: Reports: None, Retention, Urinary


Musculoskeletal History: Reports: None


Neurological History: Reports: None


Psychiatric History: Reports: None


Endocrine/Metabolic History: Reports: None, Obesity/BMI 30+


Hematologic History: Reports: None


Oncologic (Cancer) History: Reports: None





- Infectious Disease History


Infectious Disease History: Reports: Chicken Pox, None





- Past Surgical History


HEENT Surgical History: Reports: Naso-Sinus Surgery, Polypectomy


Musculoskeletal Surgical History: Reports: None, Other (See Below)





Social & Family History





- Family History


Family Medical History: Noncontributory


Cardiac: Reports: Hypertension, MI


Respiratory: Reports: Asthma


Oncologic: Reports: Lung





- Tobacco Use


Smoking Status *Q: Never Smoker


Years of Tobacco use: 25


Packs/Tins Daily: 1 (1 tin per day)


Second Hand Smoke Exposure: Yes





- Caffeine Use


Caffeine Use: Reports: Soda


Caffeine Use Comment: Occasional coffee drinker





- Recreational Drug Use


Recreational Drug Use: No





- Living Situation & Occupation


Living situation: Reports: 


Occupation: Employed





ED ROS GENERAL





- Review of Systems


Review Of Systems: See Below (See history of present illness)





ED EXAM, GENERAL





- Physical Exam


Exam: See Below (History of present illness)





Course





- Vital Signs


Last Recorded V/S: 


 Last Vital Signs











Temp  36.6 C   07/19/17 15:00


 


Pulse  98   07/19/17 15:00


 


Resp  18   07/19/17 15:00


 


BP  149/80 H  07/19/17 15:00


 


Pulse Ox  97   07/19/17 15:00














- Orders/Labs/Meds


Orders: 


 Active Orders 24 hr











 Category Date Time Status


 


 Ankle Min 3V Rt [CR] Stat Exams  07/19/17 16:14 Taken











Labs: 


 Laboratory Tests











  07/19/17 07/19/17 07/19/17 Range/Units





  15:12 15:12 15:12 


 


WBC  11.22 H    (4.0-11.0)  K/uL


 


RBC  4.62    (4.50-5.90)  M/uL


 


Hgb  14.4    (13.0-17.0)  g/dL


 


Hct  41.9    (38.0-50.0)  %


 


MCV  90.7    (80.0-98.0)  fL


 


MCH  31.2    (27.0-32.0)  pg


 


MCHC  34.4    (31.0-37.0)  g/dL


 


RDW Std Deviation  44.7    (28.0-62.0)  fl


 


RDW Coeff of Walter  14    (11.0-15.0)  %


 


Plt Count  305    (150-400)  K/uL


 


MPV  9.40    (7.40-12.00)  fL


 


Neut % (Auto)  71.4    (48.0-80.0)  %


 


Lymph % (Auto)  15.4 L    (16.0-40.0)  %


 


Mono % (Auto)  8.3    (0.0-15.0)  %


 


Eos % (Auto)  4.5    (0.0-7.0)  %


 


Baso % (Auto)  0.4    (0.0-1.5)  %


 


Neut # (Auto)  8.0 H    (1.4-5.7)  K/uL


 


Lymph # (Auto)  1.7    (0.6-2.4)  K/uL


 


Mono # (Auto)  0.9 H    (0.0-0.8)  K/uL


 


Eos # (Auto)  0.5    (0.0-0.7)  K/uL


 


Baso # (Auto)  0.1    (0.0-0.1)  K/uL


 


Nucleated RBC %  0.0    /100WBC


 


Nucleated RBCs #  0    K/uL


 


ESR    30 H  (0-14)  mm/hr


 


Uric Acid   6.4   (2.1-7.4)  mg/dL


 


C-Reactive Protein   7.51 H   (0.0-0.5)  mg/dL











Meds: 


Medications














Discontinued Medications














Generic Name Dose Route Start Last Admin





  Trade Name Freq  PRN Reason Stop Dose Admin


 


Ceftriaxone Sodium 1,000 mg/  4 mls @ 4 mls/sec  07/19/17 16:37  07/19/17 16:56





  Lidocaine HCl  IM  07/19/17 16:38  4 mls/sec





  ONETIME ONE   Administration


 


Ketorolac Tromethamine  60 mg  07/19/17 15:03  07/19/17 15:55





  Toradol  IM  07/19/17 15:04  60 mg





  ONETIME ONE   Administration














Departure





- Departure


Time of Disposition: 17:02


Disposition: Home, Self-Care 01


Condition: Good


Clinical Impression: 


 Cellulitis of right lower extremity








- Discharge Information


Prescriptions: 


Cephalexin [Keflex] 500 mg PO Q6HR #40 cap


traMADol [Ultram] 50 mg PO Q8H PRN #12 tablet


 PRN Reason: Pain


Forms:  ED Department Discharge


Additional Instructions: 


The following information is given to patients seen in the emergency department 

who are being discharged to home. This information is to outline your options 

for follow-up care. We provide all patients seen in our emergency department 

with a follow-up referral.





The need for follow-up, as well as the timing and circumstances, are variable 

depending upon the specifics of your emergency department visit.





If you don't have a primary care physician on staff, we will provide you with a 

referral. We always advise you to contact your personal physician following an 

emergency department visit to inform them of the circumstance of the visit and 

for follow-up with them and/or the need for any referrals to a consulting 

specialist.





The emergency department will also refer you to a specialist when appropriate. 

This referral assures that you have the opportunity for follow-up care with a 

specialist. All of these measure are taken in an effort to provide you with 

optimal care, which includes your follow-up.





Under all circumstances we always encourage you to contact your private 

physician who remains a resource for coordinating your care. When calling for 

follow-up care, please make the office aware that this follow-up is from your 

recent emergency room visit. If for any reason you are refused follow-up, 

please contact the [facility] Emergency 

Department at [phone] and asked to speak to the emergency department 

charge nurse.





Keflex and tramadol, Tylenol, ibuprofen or Aleve for pain, warm soaks. Follow-

up with your regular physician in one to 2 days.





[facility]


Primary Care


[address] [place] [id number]


Phone: [phone]


Fax: [phone]








- My Orders


Last 24 Hours: 


My Active Orders





07/19/17 16:14


Ankle Min 3V Rt [CR] Stat 














- Assessment/Plan


Last 24 Hours: 


My Active Orders





07/19/17 16:14


Ankle Min 3V Rt [CR] Stat

## 2017-07-20 NOTE — CR
EXAM DATE: 17



PATIENT'S AGE: 46





Patient: DENI KNOWLES



Facility: [place], [place]

Patient ID: [id number]

Site Patient ID: [id number].

Site Accession #: [id number].

: 1970

Study: XRay Extremity ankle [id number]/19/2017 4:32:48 PM

Ordering Physician: Keron Sumner



Final Report: 

HISTORY:

Pain x2 days and swelling. No injury.



FINDINGS:

Three views of the right ankle demonstrates normal appearance to the soft 
tissues. The distal fibula, tibia, mortise and talar dome are intact. No 
fracture line or dislocation is seen. No radiopaque foreign body.



IMPRESSION:

No bony abnormality within the right ankle.



Dictated by Janis Mercado MD @ 2017 4:56:29 PM





Dictated by: Janis Mercado MD @ 2017 16:56:34

(Electronic Signature)



Report Signed by Proxy.
JACKSON

## 2017-08-13 ENCOUNTER — HOSPITAL ENCOUNTER (EMERGENCY)
Dept: HOSPITAL 56 - MW.ED | Age: 47
Discharge: HOME | End: 2017-08-13
Payer: COMMERCIAL

## 2017-08-13 VITALS — DIASTOLIC BLOOD PRESSURE: 111 MMHG | SYSTOLIC BLOOD PRESSURE: 182 MMHG

## 2017-08-13 DIAGNOSIS — I10: ICD-10-CM

## 2017-08-13 DIAGNOSIS — E78.00: ICD-10-CM

## 2017-08-13 DIAGNOSIS — Z79.82: ICD-10-CM

## 2017-08-13 DIAGNOSIS — J45.901: Primary | ICD-10-CM

## 2017-08-13 DIAGNOSIS — Z98.890: ICD-10-CM

## 2017-08-13 DIAGNOSIS — K21.9: ICD-10-CM

## 2017-08-13 DIAGNOSIS — Z79.899: ICD-10-CM

## 2017-08-13 PROCEDURE — 96372 THER/PROPH/DIAG INJ SC/IM: CPT

## 2017-08-13 PROCEDURE — 99285 EMERGENCY DEPT VISIT HI MDM: CPT

## 2017-08-13 PROCEDURE — 71020: CPT

## 2017-08-13 NOTE — EDM.PDOC
ED HPI GENERAL MEDICAL PROBLEM





- General


Chief Complaint: Respiratory Problem


Stated Complaint: ASTHMA ATTACK


Time Seen by Provider: 08/13/17 19:49


Source of Information: Reports: Patient





- History of Present Illness


INITIAL COMMENTS - FREE TEXT/NARRATIVE: 





HISTORY AND PHYSICAL:


History of present illness:


[]Patient with asthma presents with shortness of breath and wheeze he did take 

an albuterol inhaler prior to arrival without any benefit, he has been having 

more difficulty with asthma as it is harder rest time and maybe this 

contributes more so than anything he has tested his house for mold he is seeing 

a pulmonologist, tomorrow is scheduled to see ENT as he has chronic sinus 

problems may contribute and is following with cardiology on Thursday











Previous hospitalization for asthma no intubations





No fever nausea vomiting chills sweats he has had cough over the last few days 

no chest pain headache dizziness or palpitation about a urine symptoms no 

shortness breath arrest








Review of systems: 


As per history of present illness and below otherwise all systems reviewed and 

negative.


Past medical history: 


As per history of present illness and as reviewed below otherwise 

noncontributory.


Surgical history: 


As per history of present illness and as reviewed below otherwise 

noncontributory.


Social history: 


No reported history of drug or alcohol abuse.


Family history: 


As per history of present illness and as reviewed below otherwise 

noncontributory.


Physical exam:


HEENT: Atraumatic, normocephalic, pupils reactive, negative for conjunctival 

pallor or scleral icterus, mucous membranes moist, throat clear, neck supple, 

nontender, trachea midline.


Lungs: Clear to auscultation, breath sounds equal bilaterally, chest nontender.


Heart: S1S2, regular, negative for clicks, rubs, or JVD.


Abdomen: Soft, nondistended, nontender. Negative for masses or 

hepatosplenomegaly. Negative for costovertebral tenderness.


Pelvis: Stable nontender.


Genitourinary: Deferred.


Rectal: Deferred.


Extremities: Atraumatic, negative for cords or calf pain. Neurovascular 

unremarkable.


Neuro: Awake, alert, oriented. Cranial nerves II through XII unremarkable. 

Cerebellum unremarkable. Motor and sensory unremarkable throughout. Exam 

nonfocal.


Diagnostics:


[]Chest 2 views








Therapeutics:


[]DuoNeb


Solu-Medrol 125 mg IM





DuoNeb


Z-Sherwin


Medrol Dosepak








Impression: 


[]Asthma exacerbation


Definitive disposition and diagnosis as appropriate pending reevaluation and 

review of above.


  ** no pain


Pain Score (Numeric/FACES): 0





- Related Data


 Allergies











Allergy/AdvReac Type Severity Reaction Status Date / Time


 


No Known Allergies Allergy   Verified 08/13/17 19:46











Home Meds: 


 Home Meds





Albuterol [Ventolin 2 MG/5 ML]  09/06/16 [History]


Aspirin 81 mg PO DAILY 09/06/16 [History]


Mometasone/Formoterol [Dulera 200-5 MCG] 2 puff IH BIDRT 09/06/16 [History]


Albuterol [Ventolin HFA]  12/10/16 [History]


Albuterol [Ventolin HFA] 2 inh INH Q4HR 04/03/17 [History]


Albuterol/Ipratropium [DuoNeb 3.0-0.5 MG/3 ML] 1 ampule PO ASDIRECTED 04/03/17 [

History]


Montelukast [Singulair] 1 tab PO DAILY 04/03/17 [History]


Fluticasone Propionate [Flonase] 0 gm NASBOTH BID  bottle 04/04/17 [Rx]


Loratadine [Claritin] 10 mg PO DAILY #0 tablet 04/04/17 [Rx]


Cephalexin [Keflex] 500 mg PO Q6HR #40 cap 07/19/17 [Rx]


Losartan [Cozaar] 100 mg PO DAILY 07/19/17 [History]


traMADol [Ultram] 50 mg PO Q8H PRN #12 tablet 07/19/17 [Rx]











Past Medical History


HEENT History: Reports: Impaired Vision


Other HEENT History: Patient reports farsightedness


Cardiovascular History: Reports: High Cholesterol, Hypertension


Respiratory History: Reports: Asthma.  Denies: PE, Sleep Apnea


Gastrointestinal History: Reports: Chronic Constipation, GERD, None


Genitourinary History: Reports: None, Retention, Urinary


Musculoskeletal History: Reports: None


Neurological History: Reports: None


Psychiatric History: Reports: None


Endocrine/Metabolic History: Reports: None, Obesity/BMI 30+


Hematologic History: Reports: None


Oncologic (Cancer) History: Reports: None





- Infectious Disease History


Infectious Disease History: Reports: Chicken Pox, None





- Past Surgical History


HEENT Surgical History: Reports: Naso-Sinus Surgery, Polypectomy


Musculoskeletal Surgical History: Reports: None, Other (See Below)





Social & Family History





- Family History


Family Medical History: Noncontributory


Cardiac: Reports: Hypertension, MI


Respiratory: Reports: Asthma


Oncologic: Reports: Lung





- Tobacco Use


Smoking Status *Q: Never Smoker


Years of Tobacco use: 25


Packs/Tins Daily: 1 (1 tin per day)


Second Hand Smoke Exposure: Yes





- Caffeine Use


Caffeine Use: Reports: Soda


Caffeine Use Comment: Occasional coffee drinker





- Recreational Drug Use


Recreational Drug Use: No





- Living Situation & Occupation


Living situation: Reports: 


Occupation: Employed





ED ROS GENERAL





- Review of Systems


Review Of Systems: ROS reveals no pertinent complaints other than HPI.





ED EXAM, GENERAL





- Physical Exam


Exam: See Below





Course





- Vital Signs


Last Recorded V/S: 


 Last Vital Signs











Temp  36.4 C   08/13/17 19:47


 


Pulse  101 H  08/13/17 19:47


 


Resp  24 H  08/13/17 19:47


 


BP  175/94 H  08/13/17 20:04


 


Pulse Ox  92 L  08/13/17 19:47














- Orders/Labs/Meds


Orders: 


 Active Orders 24 hr











 Category Date Time Status


 


 RT Aerosol Therapy [RC] ASDIRECTED Care  08/13/17 19:45 Active


 


 Chest 2V [CR] Stat Exams  08/13/17 19:46 Taken











Meds: 


Medications














Discontinued Medications














Generic Name Dose Route Start Last Admin





  Trade Name Dalia  PRN Reason Stop Dose Admin


 


Albuterol/Ipratropium  3 ml  08/13/17 19:44  08/13/17 19:50





  Duoneb 3.0-0.5 Mg/3 Ml  NEB  08/13/17 19:45  3 ml





  ONETIME ONE   Administration


 


Methylprednisolone Sodium Succinate  125 mg  08/13/17 19:46  08/13/17 19:50





  Solu-Medrol  IM  08/13/17 19:47  125 mg





  ONETIME ONE   Administration














Departure





- Departure


Time of Disposition: 20:35


Disposition: Home, Self-Care 01


Condition: Good


Clinical Impression: 


 Exacerbation of asthma








- Discharge Information


Forms:  ED Department Discharge


Additional Instructions: 


Medication as prescribed


Return if symptoms persist or worsen


Follow-up with primary care in 2 weeks, follow-up with specialty care this week 

as scheduled





The following information is given to patients seen in the emergency department 

who are being discharged to home. This information is to outline your options 

for follow-up care. We provide all patients seen in our emergency department 

with a follow-up referral.





The need for follow-up, as well as the timing and circumstances, are variable 

depending upon the specifics of your emergency department visit.





If you don't have a primary care physician on staff, we will provide you with a 

referral. We always advise you to contact your personal physician following an 

emergency department visit to inform them of the circumstance of the visit and 

for follow-up with them and/or the need for any referrals to a consulting 

specialist.





The emergency department will also refer you to a specialist when appropriate. 

This referral assures that you have the opportunity for follow-up care with a 

specialist. All of these measure are taken in an effort to provide you with 

optimal care, which includes your follow-up.





Under all circumstances we always encourage you to contact your private 

physician who remains a resource for coordinating your care. When calling for 

follow-up care, please make the office aware that this follow-up is from your 

recent emergency room visit. If for any reason you are refused follow-up, 

please contact the [facility] emergency department at [phone] 

and asked to speak to the emergency department charge nurse.








- My Orders


Last 24 Hours: 


My Active Orders





08/13/17 19:45


RT Aerosol Therapy [RC] ASDIRECTED 





08/13/17 19:46


Chest 2V [CR] Stat 














- Assessment/Plan


Last 24 Hours: 


My Active Orders





08/13/17 19:45


RT Aerosol Therapy [RC] ASDIRECTED 





08/13/17 19:46


Chest 2V [CR] Stat

## 2017-08-28 ENCOUNTER — HOSPITAL ENCOUNTER (OUTPATIENT)
Dept: HOSPITAL 56 - MW.ED | Age: 47
Setting detail: OBSERVATION
LOS: 1 days | Discharge: HOME | End: 2017-08-29
Attending: INTERNAL MEDICINE | Admitting: INTERNAL MEDICINE
Payer: COMMERCIAL

## 2017-08-28 DIAGNOSIS — Z98.890: ICD-10-CM

## 2017-08-28 DIAGNOSIS — E66.9: ICD-10-CM

## 2017-08-28 DIAGNOSIS — Z79.899: ICD-10-CM

## 2017-08-28 DIAGNOSIS — I10: ICD-10-CM

## 2017-08-28 DIAGNOSIS — Z79.2: ICD-10-CM

## 2017-08-28 DIAGNOSIS — Z79.51: ICD-10-CM

## 2017-08-28 DIAGNOSIS — J45.901: Primary | ICD-10-CM

## 2017-08-28 LAB
CHLORIDE SERPL-SCNC: 109 MMOL/L (ref 98–110)
SODIUM SERPL-SCNC: 141 MMOL/L (ref 136–146)

## 2017-08-28 PROCEDURE — 99285 EMERGENCY DEPT VISIT HI MDM: CPT

## 2017-08-28 PROCEDURE — 93005 ELECTROCARDIOGRAM TRACING: CPT

## 2017-08-28 PROCEDURE — 85025 COMPLETE CBC W/AUTO DIFF WBC: CPT

## 2017-08-28 PROCEDURE — G0378 HOSPITAL OBSERVATION PER HR: HCPCS

## 2017-08-28 PROCEDURE — 71010: CPT

## 2017-08-28 PROCEDURE — 96376 TX/PRO/DX INJ SAME DRUG ADON: CPT

## 2017-08-28 PROCEDURE — 81001 URINALYSIS AUTO W/SCOPE: CPT

## 2017-08-28 PROCEDURE — 96361 HYDRATE IV INFUSION ADD-ON: CPT

## 2017-08-28 PROCEDURE — 96374 THER/PROPH/DIAG INJ IV PUSH: CPT

## 2017-08-28 PROCEDURE — 94640 AIRWAY INHALATION TREATMENT: CPT

## 2017-08-28 PROCEDURE — 83880 ASSAY OF NATRIURETIC PEPTIDE: CPT

## 2017-08-28 PROCEDURE — 80048 BASIC METABOLIC PNL TOTAL CA: CPT

## 2017-08-28 PROCEDURE — 80053 COMPREHEN METABOLIC PANEL: CPT

## 2017-08-28 PROCEDURE — 36415 COLL VENOUS BLD VENIPUNCTURE: CPT

## 2017-08-28 RX ADMIN — METHYLPREDNISOLONE SODIUM SUCCINATE ONE MG: 125 INJECTION, POWDER, FOR SOLUTION INTRAMUSCULAR; INTRAVENOUS at 09:07

## 2017-08-28 RX ADMIN — CLOTRIMAZOLE SCH EACH: 10 LOZENGE ORAL; TOPICAL at 20:47

## 2017-08-28 RX ADMIN — METHYLPREDNISOLONE SODIUM SUCCINATE ONE MG: 125 INJECTION, POWDER, FOR SOLUTION INTRAMUSCULAR; INTRAVENOUS at 08:26

## 2017-08-28 RX ADMIN — METHYLPREDNISOLONE SODIUM SUCCINATE SCH MG: 125 INJECTION, POWDER, FOR SOLUTION INTRAMUSCULAR; INTRAVENOUS at 21:41

## 2017-08-28 RX ADMIN — METHYLPREDNISOLONE SODIUM SUCCINATE SCH MG: 125 INJECTION, POWDER, FOR SOLUTION INTRAMUSCULAR; INTRAVENOUS at 15:10

## 2017-08-28 RX ADMIN — METHYLPREDNISOLONE SODIUM SUCCINATE ONE: 125 INJECTION, POWDER, FOR SOLUTION INTRAMUSCULAR; INTRAVENOUS at 09:11

## 2017-08-28 NOTE — EDM.PDOC
ED HPI GENERAL MEDICAL PROBLEM





- General


Chief Complaint: Respiratory Problem


Stated Complaint: HARD TIME BREATHING


Time Seen by Provider: 08/28/17 07:58





- History of Present Illness


INITIAL COMMENTS - FREE TEXT/NARRATIVE: 


HISTORY AND PHYSICAL:





History of present illness:


Patient 46-year-old male with medical history significant for asthma presents 

with concern of shortness of breath and chest congestion over last 24 hours she 

has used his nebulizer with no improvement he denies fever chills nausea 

vomiting or other complaints





Review of systems: 


As per history of present illness and below otherwise all systems reviewed and 

negative.





Past medical history: 


As per history of present illness and as reviewed below otherwise 

noncontributory.





Surgical history: 


As per history of present illness and as reviewed below otherwise 

noncontributory.





Social history: 


No reported history of drug or alcohol abuse.





Family history: 


As per history of present illness and as reviewed below otherwise 

noncontributory.





Physical exam:


HEENT: Atraumatic, normocephalic, pupils reactive, negative for conjunctival 

pallor or scleral icterus, mucous membranes moist, throat clear, neck supple, 

nontender, trachea midline.


Lungs: Coarse bilaterally with scattered inspiratory and expiratory wheezing 

noted no crackles no rhonchi, breath sounds equal bilaterally, chest nontender.


Heart: S1S2, regular, negative for clicks, rubs, or JVD.


Abdomen: Soft, nondistended, nontender. Negative for masses or 

hepatosplenomegaly. Negative for costovertebral tenderness.


Pelvis: Stable nontender.


Genitourinary: Deferred.


Rectal: Deferred.


Extremities: Atraumatic, negative for cords or calf pain. Neurovascular 

unremarkable.


Neuro: Awake, alert, oriented. Cranial nerves II through XII unremarkable. 

Cerebellum unremarkable. Motor and sensory unremarkable throughout. Exam 

nonfocal.





Diagnostics:


CBC CMP chest x-ray EKG





Therapeutics:


Albuterol ipratropium nebulizer Solu-Medrol 125 mg IV





Impression: 


#1 acute asthmatic exacerbation





Definitive disposition and diagnosis as appropriate pending reevaluation and 

review of above.








- Related Data


 Allergies











Allergy/AdvReac Type Severity Reaction Status Date / Time


 


No Known Allergies Allergy   Verified 08/28/17 07:56











Home Meds: 


 Home Meds





Mometasone/Formoterol [Dulera 200-5 MCG] 2 puff IH BIDRT 09/06/16 [History]


Albuterol [Ventolin HFA] 2 inh INH Q4HR 04/03/17 [History]


Albuterol/Ipratropium [DuoNeb 3.0-0.5 MG/3 ML] 1 ampule PO ASDIRECTED 04/03/17 [

History]


Montelukast [Singulair] 1 tab PO DAILY 04/03/17 [History]


Fluticasone Propionate [Flonase] 0 gm NASBOTH BID  bottle 04/04/17 [Rx]


Loratadine [Claritin] 10 mg PO DAILY #0 tablet 04/04/17 [Rx]


Cephalexin [Keflex] 500 mg PO Q6HR #40 cap 07/19/17 [Rx]


Losartan [Cozaar] 100 mg PO DAILY 07/19/17 [History]


traMADol [Ultram] 50 mg PO Q8H PRN #12 tablet 07/19/17 [Rx]











Past Medical History


HEENT History: Reports: Impaired Vision


Other HEENT History: Patient reports farsightedness


Cardiovascular History: Reports: High Cholesterol, Hypertension


Respiratory History: Reports: Asthma


Gastrointestinal History: Reports: Chronic Constipation, GERD, None


Genitourinary History: Reports: None, Retention, Urinary


Musculoskeletal History: Reports: None


Neurological History: Reports: None


Psychiatric History: Reports: None


Endocrine/Metabolic History: Reports: None, Obesity/BMI 30+


Hematologic History: Reports: None


Immunologic History: Reports: None


Oncologic (Cancer) History: Reports: None


Dermatologic History: Reports: None





- Infectious Disease History


Infectious Disease History: Reports: Chicken Pox





- Past Surgical History


HEENT Surgical History: Reports: Naso-Sinus Surgery, Polypectomy


Musculoskeletal Surgical History: Reports: None, Other (See Below)





Social & Family History





- Family History


Family Medical History: Noncontributory


Cardiac: Reports: Hypertension, MI


Respiratory: Reports: Asthma


Oncologic: Reports: Lung





- Tobacco Use


Smoking Status *Q: Never Smoker


Years of Tobacco use: 25


Packs/Tins Daily: 1 (1 tin per day)


Second Hand Smoke Exposure: Yes





- Caffeine Use


Caffeine Use: Reports: Tea


Caffeine Use Comment: Occasional coffee drinker





- Recreational Drug Use


Recreational Drug Use: No





- Living Situation & Occupation


Living situation: Reports: 


Occupation: Employed





ED ROS GENERAL





- Review of Systems


Review Of Systems: ROS reveals no pertinent complaints other than HPI.





ED EXAM, GENERAL





- Physical Exam


Exam: See Below (See dictation)





Course





- Vital Signs


Last Recorded V/S: 


 Last Vital Signs











Temp  36.0 C   08/28/17 07:54


 


Pulse  115 H  08/28/17 07:54


 


Resp  22 H  08/28/17 07:54


 


BP  169/92 H  08/28/17 07:54


 


Pulse Ox  92 L  08/28/17 07:54














- Orders/Labs/Meds


Orders: 


 Active Orders 24 hr











 Category Date Time Status


 


 EKG Documentation Completion [RC] STAT Care  08/28/17 07:58 Active


 


 RT Aerosol Therapy [RC] ASDIRECTED Care  08/28/17 07:59 Active


 


 Sodium Chloride 0.9% [Normal Saline] 1,000 ml Med  08/28/17 08:00 Active





 IV STAT   








 Medication Orders





Sodium Chloride (Normal Saline)  1,000 mls @ 125 mls/hr IV STAT HENNA


   Last Admin: 08/28/17 08:28  Dose: 125 mls/hr








Labs: 


 Laboratory Tests











  08/28/17 08/28/17 08/28/17 Range/Units





  08:22 08:22 08:22 


 


WBC  7.09    (4.0-11.0)  K/uL


 


RBC  4.64    (4.50-5.90)  M/uL


 


Hgb  14.3    (13.0-17.0)  g/dL


 


Hct  41.4    (38.0-50.0)  %


 


MCV  89.2    (80.0-98.0)  fL


 


MCH  30.8    (27.0-32.0)  pg


 


MCHC  34.5    (31.0-37.0)  g/dL


 


RDW Std Deviation  42.8    (28.0-62.0)  fl


 


RDW Coeff of Walter  13    (11.0-15.0)  %


 


Plt Count  294    (150-400)  K/uL


 


MPV  8.90    (7.40-12.00)  fL


 


Neut % (Auto)  58.8    (48.0-80.0)  %


 


Lymph % (Auto)  22.6    (16.0-40.0)  %


 


Mono % (Auto)  7.9    (0.0-15.0)  %


 


Eos % (Auto)  10.3 H    (0.0-7.0)  %


 


Baso % (Auto)  0.4    (0.0-1.5)  %


 


Neut # (Auto)  4.2    (1.4-5.7)  K/uL


 


Lymph # (Auto)  1.6    (0.6-2.4)  K/uL


 


Mono # (Auto)  0.6    (0.0-0.8)  K/uL


 


Eos # (Auto)  0.7    (0.0-0.7)  K/uL


 


Baso # (Auto)  0.0    (0.0-0.1)  K/uL


 


Nucleated RBC %  0.0    /100WBC


 


Nucleated RBCs #  0    K/uL


 


Sodium   141   (136-146)  mmol/L


 


Potassium   4.0   (3.5-5.1)  mmol/L


 


Chloride   109   ()  mmol/L


 


Carbon Dioxide   22   (21-31)  mmol/L


 


BUN   15   (6.0-23.0)  mg/dL


 


Creatinine   1.2   (0.6-1.5)  mg/dL


 


Est Cr Clr Drug Dosing   81.05   mL/min


 


Estimated GFR (MDRD)   > 60.0   ml/min


 


Glucose   93   ()  mg/dL


 


Calcium   10.1   (8.8-10.8)  mg/dL


 


Total Bilirubin   1.0   (0.1-1.5)  mg/dL


 


AST   44 H   (5-40)  IU/L


 


ALT   68 H   (8-54)  IU/L


 


Alkaline Phosphatase   126   ()  


 


B-Natriuretic Peptide    27  (<100)  PG/ML


 


Total Protein   7.2   (6.0-8.0)  g/dL


 


Albumin   4.1   (3.5-5.0)  g/dL


 


Globulin   3.1   (2.0-3.5)  g/dL


 


Albumin/Globulin Ratio   1.3   (1.3-2.8)  


 


Urine Color     


 


Urine Appearance     


 


Urine pH     (5.0-8.0)  


 


Ur Specific Gravity     (1.001-1.035)  


 


Urine Protein     (NEGATIVE)  mg/dL


 


Urine Glucose (UA)     (NEGATIVE)  mg/dL


 


Urine Ketones     (NEGATIVE)  mg/dL


 


Urine Occult Blood     (NEGATIVE)  


 


Urine Nitrite     (NEGATIVE)  


 


Urine Bilirubin     (NEGATIVE)  


 


Urine Urobilinogen     (<2.0)  EU/dL


 


Ur Leukocyte Esterase     (NEGATIVE)  


 


Urine RBC     (0-2/HPF)  


 


Urine WBC     (0-5/HPF)  


 


Ur Epithelial Cells     (NONE-FEW)  


 


Urine Bacteria     (NEGATIVE)  














  08/28/17 Range/Units





  10:22 


 


WBC   (4.0-11.0)  K/uL


 


RBC   (4.50-5.90)  M/uL


 


Hgb   (13.0-17.0)  g/dL


 


Hct   (38.0-50.0)  %


 


MCV   (80.0-98.0)  fL


 


MCH   (27.0-32.0)  pg


 


MCHC   (31.0-37.0)  g/dL


 


RDW Std Deviation   (28.0-62.0)  fl


 


RDW Coeff of Walter   (11.0-15.0)  %


 


Plt Count   (150-400)  K/uL


 


MPV   (7.40-12.00)  fL


 


Neut % (Auto)   (48.0-80.0)  %


 


Lymph % (Auto)   (16.0-40.0)  %


 


Mono % (Auto)   (0.0-15.0)  %


 


Eos % (Auto)   (0.0-7.0)  %


 


Baso % (Auto)   (0.0-1.5)  %


 


Neut # (Auto)   (1.4-5.7)  K/uL


 


Lymph # (Auto)   (0.6-2.4)  K/uL


 


Mono # (Auto)   (0.0-0.8)  K/uL


 


Eos # (Auto)   (0.0-0.7)  K/uL


 


Baso # (Auto)   (0.0-0.1)  K/uL


 


Nucleated RBC %   /100WBC


 


Nucleated RBCs #   K/uL


 


Sodium   (136-146)  mmol/L


 


Potassium   (3.5-5.1)  mmol/L


 


Chloride   ()  mmol/L


 


Carbon Dioxide   (21-31)  mmol/L


 


BUN   (6.0-23.0)  mg/dL


 


Creatinine   (0.6-1.5)  mg/dL


 


Est Cr Clr Drug Dosing   mL/min


 


Estimated GFR (MDRD)   ml/min


 


Glucose   ()  mg/dL


 


Calcium   (8.8-10.8)  mg/dL


 


Total Bilirubin   (0.1-1.5)  mg/dL


 


AST   (5-40)  IU/L


 


ALT   (8-54)  IU/L


 


Alkaline Phosphatase   ()  


 


B-Natriuretic Peptide   (<100)  PG/ML


 


Total Protein   (6.0-8.0)  g/dL


 


Albumin   (3.5-5.0)  g/dL


 


Globulin   (2.0-3.5)  g/dL


 


Albumin/Globulin Ratio   (1.3-2.8)  


 


Urine Color  YELLOW  


 


Urine Appearance  CLEAR  


 


Urine pH  5.5  (5.0-8.0)  


 


Ur Specific Gravity  1.020  (1.001-1.035)  


 


Urine Protein  NEGATIVE  (NEGATIVE)  mg/dL


 


Urine Glucose (UA)  NEGATIVE  (NEGATIVE)  mg/dL


 


Urine Ketones  NEGATIVE  (NEGATIVE)  mg/dL


 


Urine Occult Blood  NEGATIVE  (NEGATIVE)  


 


Urine Nitrite  NEGATIVE  (NEGATIVE)  


 


Urine Bilirubin  NEGATIVE  (NEGATIVE)  


 


Urine Urobilinogen  0.2  (<2.0)  EU/dL


 


Ur Leukocyte Esterase  NEGATIVE  (NEGATIVE)  


 


Urine RBC  NONE SEEN  (0-2/HPF)  


 


Urine WBC  0-1  (0-5/HPF)  


 


Ur Epithelial Cells  RARE  (NONE-FEW)  


 


Urine Bacteria  RARE  (NEGATIVE)  











Meds: 


Medications











Generic Name Dose Route Start Last Admin





  Trade Name Freq  PRN Reason Stop Dose Admin


 


Sodium Chloride  1,000 mls @ 125 mls/hr  08/28/17 08:00  08/28/17 08:28





  Normal Saline  IV   125 mls/hr





  STAT HENNA   Administration














Discontinued Medications














Generic Name Dose Route Start Last Admin





  Trade Name Freq  PRN Reason Stop Dose Admin


 


Albuterol/Ipratropium  3 ml  08/28/17 07:59  08/28/17 08:07





  Duoneb 3.0-0.5 Mg/3 Ml  NEB  08/28/17 08:00  3 ml





  ONETIME ONE   Administration


 


Methylprednisolone Sodium Succinate  125 mg  08/28/17 07:59  08/28/17 09:11





  Solu-Medrol  IVPUSH  08/28/17 08:00  Not Given





  ONETIME ONE   














Departure





- Departure


Time of Disposition: 10:43


Disposition: Home, Self-Care 01


Condition: Good


Clinical Impression: 


 Acute asthma








- Discharge Information


Referrals: 


Richie Hurley MD [Primary Care Provider] - 


Forms:  ED Department Discharge


Additional Instructions: 


The following information is given to patients seen in the emergency department 

who are being discharged to home. This information is to outline your options 

for follow-up care. We provide all patients seen in our emergency department 

with a follow-up referral.





The need for follow-up, as well as the timing and circumstances, are variable 

depending upon the specifics of your emergency department visit.





If you don't have a primary care physician on staff, we will provide you with a 

referral. We always advise you to contact your personal physician following an 

emergency department visit to inform them of the circumstance of the visit and 

for follow-up with them and/or the need for any referrals to a consulting 

specialist.





The emergency department will also refer you to a specialist when appropriate. 

This referral assures that you have the opportunity for followup care with a 

specialist. All of these measure are taken in an effort to provide you with 

optimal care, which includes your followup.





Under all circumstances we always encourage you to contact your private 

physician who remains a resource for coordinating  your care. When calling for 

followup care, please make the office aware that this follow-up is from your 

recent emergency room visit. If for any reason you are refused follow-up, 

please contact the [facility] emergency department at [phone] 

and asked to speak to the emergency department charge nurse.











Primary Care


[address] [place] [id number]


Phone: [phone]


Fax: [phone]




















Medrol albuterol as prescribed follow-up primary medical doctor and/or clinic 

above is discussed return as needed as discussed





- My Orders


Last 24 Hours: 


My Active Orders





08/28/17 07:58


EKG Documentation Completion [RC] STAT 





08/28/17 07:59


RT Aerosol Therapy [RC] ASDIRECTED 





08/28/17 08:00


Sodium Chloride 0.9% [Normal Saline] 1,000 ml IV STAT 














- Assessment/Plan


Last 24 Hours: 


My Active Orders





08/28/17 07:58


EKG Documentation Completion [RC] STAT 





08/28/17 07:59


RT Aerosol Therapy [RC] ASDIRECTED 





08/28/17 08:00


Sodium Chloride 0.9% [Normal Saline] 1,000 ml IV STAT

## 2017-08-28 NOTE — CR
EXAMINATION: Portable chest radiograph.

 

HISTORY: Shortness of breath.

 

FINDINGS: 

The trachea is midline. The cardiomediastinal silhouette is within normal limits. No pulmonary infilt
rates, effusions or pneumothorax.

 

Osseous structures appear unremarkable.

 

IMPRESSION: 

No acute cardiopulmonary process.

## 2017-08-28 NOTE — PCM.HP
H&P History of Present Illness





- History of Present Illness


Initial Comments - Free Text/Narative: 





45 yo with pmh of asthma who presents with one day history of shortness of 

breath, wheezing and cough.  He denies any fevers, chills, or chest pain.





- Related Data


Allergies/Adverse Reactions: 


 Allergies











Allergy/AdvReac Type Severity Reaction Status Date / Time


 


No Known Allergies Allergy   Verified 08/28/17 07:56











Home Medications: 


 Home Meds





Mometasone/Formoterol [Dulera 200-5 MCG] 2 puff IH BIDRT 09/06/16 [History]


Albuterol [Ventolin HFA] 2 inh INH Q4HR 04/03/17 [History]


Albuterol/Ipratropium [DuoNeb 3.0-0.5 MG/3 ML] 1 ampule PO ASDIRECTED 04/03/17 [

History]


Montelukast [Singulair] 1 tab PO DAILY 04/03/17 [History]


Fluticasone Propionate [Flonase] 0 gm NASBOTH BID  bottle 04/04/17 [Rx]


Loratadine [Claritin] 10 mg PO DAILY #0 tablet 04/04/17 [Rx]


Cephalexin [Keflex] 500 mg PO Q6HR #40 cap 07/19/17 [Rx]


Losartan [Cozaar] 100 mg PO DAILY 07/19/17 [History]


traMADol [Ultram] 50 mg PO Q8H PRN #12 tablet 07/19/17 [Rx]











Past Medical History


HEENT History: Reports: Impaired Vision


Other HEENT History: Patient reports farsightedness


Cardiovascular History: Reports: Hypertension


Respiratory History: Reports: Asthma


Gastrointestinal History: Reports: None


Genitourinary History: Reports: None, Retention, Urinary


Musculoskeletal History: Reports: None, Gout


Neurological History: Reports: None


Psychiatric History: Reports: None


Endocrine/Metabolic History: Reports: None, Obesity/BMI 30+


Hematologic History: Reports: None


Immunologic History: Reports: None


Oncologic (Cancer) History: Reports: None


Dermatologic History: Reports: None





- Infectious Disease History


Infectious Disease History: Reports: Chicken Pox





- Past Surgical History


HEENT Surgical History: Reports: Naso-Sinus Surgery, Polypectomy


Musculoskeletal Surgical History: Reports: None, Other (See Below)





Social & Family History





- Family History


Family Medical History: Noncontributory


Cardiac: Reports: Hypertension, MI


Respiratory: Reports: Asthma, Other (See Below)


Other Respiratory Family Hisory: Mother had lung CA. She was a heavy smoker.


Oncologic: Reports: Lung





- Tobacco Use


Smoking Status *Q: Never Smoker


Years of Tobacco use: 25


Packs/Tins Daily: 1 (1 tin per day)


Second Hand Smoke Exposure: No





- Caffeine Use


Caffeine Use: Reports: Tea


Caffeine Use Comment: Occasional coffee drinker





- Alcohol Use


Days Per Week of Alcohol Use: 0


Number of Drinks Per Day: 0


Total Drinks Per Week: 0





- Recreational Drug Use


Recreational Drug Use: No





- Living Situation & Occupation


Living situation: Reports: 


Occupation: Employed





H&P Review of Systems





- Review of Systems:


Review Of Systems: ROS reveals no pertinent complaints other than HPI.





Exam





- Exam


Exam: See Below





- Vital Signs


Vital Signs: 


 Last Vital Signs











Temp  36.4 C   08/28/17 11:23


 


Pulse  92   08/28/17 11:45


 


Resp  22 H  08/28/17 11:45


 


BP  133/72   08/28/17 11:45


 


Pulse Ox  94 L  08/28/17 11:45











Weight: 127.459 kg





- Exam


General: Alert, Oriented, 4


HEENT: Mucosa Moist & Pink


Neck: Supple


Lungs: Wheezing


Cardiovascular: Regular Rate, Regular Rhythm


GI/Abdominal Exam: Normal Bowel Sounds, Soft, Non-Tender, No Organomegaly, No 

Distention, No Mass, Pelvis Stable


Extremities: Pedal Edema (mild)


Skin: Warm, Dry, Intact





- Patient Data


Result Diagrams: 


 08/28/17 08:22





 08/28/17 08:22





*Q Meaningful Use (ADM)





- VTE *Q


VTE Criteria *Q: 








- Stroke *Q


Stroke Criteria *Q: 








- AMI *Q


AMI Criteria *Q: 





Problem List Initiated/Reviewed/Updated: Yes


Orders Last 24hrs: 


 Active Orders 24 hr











 Category Date Time Status


 


 Antiembolic Devices [RC] PER UNIT ROUTINE Care  08/28/17 17:55 Ordered


 


 Intake and Output [RC] QSHIFT Care  08/28/17 17:55 Ordered


 


 Oxygen Therapy [RC] PRN Care  08/28/17 17:55 Ordered


 


 RT Aerosol Therapy [RC] ASDIRECTED Care  08/28/17 11:24 Active


 


 RT Aerosol Therapy [RC] ASDIRECTED Care  08/28/17 14:45 Active


 


 Up ad [place] [RC] ASDIRECTED Care  08/28/17 17:55 Ordered


 


 VTE/DVT Education [RC] PER UNIT ROUTINE Care  08/28/17 17:55 Ordered


 


 Vital Signs [RC] Q4H Care  08/28/17 17:55 Ordered


 


 Regular Diet [DIET] Diet  08/28/17 Dinner Active


 


 BASIC METABOLIC PANEL,BMP [CHEM] AM Lab  08/29/17 05:11 Ordered


 


 CBC WITH AUTO DIFF [HEME] AM Lab  08/29/17 05:11 Ordered


 


 Albuterol/Ipratropium [DuoNeb 3.0-0.5 MG/3 ML] Med  08/28/17 14:44 Active





 3 ml NEB Q2H PRN   


 


 Albuterol/Ipratropium [DuoNeb 3.0-0.5 MG/3 ML] Med  08/28/17 14:45 Active





 3 ml NEB Q6HRRT   


 


 Enoxaparin [Lovenox] Med  08/29/17 09:00 Ordered





 40 mg SUBCUT DAILY   


 


 Loratadine [Claritin] Med  08/29/17 09:00 Active





 10 mg PO DAILY   


 


 Losartan [Cozaar] Med  08/29/17 09:00 Active





 100 mg PO DAILY   


 


 Montelukast [Singulair] Med  08/29/17 09:00 Active





 10 mg PO DAILY   


 


 Patient's Own Medication [Ptom] Med  08/28/17 21:00 Active





 1 each INH BIDRT   


 


 methylPREDNISolone Sod Succ [Solu-MEDROL] Med  08/28/17 15:00 Active





 125 mg IVPUSH Q6H   


 


 Sequential Compression Device [OM.PC] Per Unit Routine Oth  08/28/17 17:55 

Ordered


 


 Resuscitation Status Routine Resus Stat  08/28/17 17:55 Ordered








 Medication Orders





Albuterol/Ipratropium (Duoneb 3.0-0.5 Mg/3 Ml)  3 ml NEB Q6HRRT [facility]


   Last Admin: 08/28/17 17:31  Dose: 3 ml


   Admin: 08/28/17 14:54  Dose: 3 ml


Albuterol/Ipratropium (Duoneb 3.0-0.5 Mg/3 Ml)  3 ml NEB Q2H PRN


   PRN Reason: Wheezing


Sodium Chloride (Normal Saline)  1,000 mls @ 125 mls/hr IV STAT [facility]


   Last Admin: 08/28/17 08:28  Dose: 125 mls/hr


Loratadine (Claritin)  10 mg PO DAILY [facility]


Losartan Potassium (Cozaar)  100 mg PO DAILY [facility]


Methylprednisolone Sodium Succinate (Solu-Medrol)  125 mg IVPUSH Q6H [facility]


   Last Admin: 08/28/17 15:10  Dose: 125 mg


Montelukast Sodium (Singulair)  10 mg PO DAILY [facility]


Patient Own Medication (Ptom)  1 each INH BIDRT [facility]








Assessment/Plan Comment:: 





45 yo male admitted with asthma exacerbation.  We will treat with solumedrol 

and duonebs

## 2017-08-29 VITALS — DIASTOLIC BLOOD PRESSURE: 92 MMHG | SYSTOLIC BLOOD PRESSURE: 148 MMHG

## 2017-08-29 LAB
CHLORIDE SERPL-SCNC: 110 MMOL/L (ref 98–110)
SODIUM SERPL-SCNC: 141 MMOL/L (ref 136–146)

## 2017-08-29 RX ADMIN — CLOTRIMAZOLE SCH EACH: 10 LOZENGE ORAL; TOPICAL at 05:46

## 2017-08-29 RX ADMIN — METHYLPREDNISOLONE SODIUM SUCCINATE SCH MG: 125 INJECTION, POWDER, FOR SOLUTION INTRAMUSCULAR; INTRAVENOUS at 02:46

## 2017-08-29 NOTE — PCM.DCSUM1
**Discharge Summary





- Discharge Data


Discharge Date: 08/29/17


Discharge Disposition: Home, Self-Care 01


Condition: Good





- Patient Summary/Data


Hospital Course: 





Admission diagnosis


Acute asthma exacerbation








Hospital course: 45 yo with pmh of asthma who presented with one day history of 

shortness of breath, wheezing and cough.  On admision wheezing was noted on 

lung exam but no labor breath.  He was satting 92% on room air.  WBC was 7,090 

and CXR showed no acute cadiopulmonary process. He was treated with solumedrol 

and duonebs and monitored overnight.  This morning he is requesting discharge.  

He was discharge home on oral prednison 40mg daily for five days.





- Patient Instructions


Diet: Regular Diet as Tolerated





- Discharge Plan


Prescriptions/Med Rec: 


Prednisone [IMW: predniSONE] 40 mg PO WITHBREAKFAST #10 tab


Home Medications: 


 Home Meds





Mometasone/Formoterol [Dulera 200-5 MCG] 2 puff IH BIDRT 09/06/16 [History]


Albuterol [Ventolin HFA] 2 inh INH Q4HR 04/03/17 [History]


Albuterol/Ipratropium [DuoNeb 3.0-0.5 MG/3 ML] 1 ampule PO ASDIRECTED 04/03/17 [

History]


Montelukast [Singulair] 1 tab PO DAILY 04/03/17 [History]


Fluticasone Propionate [Flonase] 0 gm NASBOTH BID  bottle 04/04/17 [Rx]


Loratadine [Claritin] 10 mg PO DAILY #0 tablet 04/04/17 [Rx]


Cephalexin [Keflex] 500 mg PO Q6HR #40 cap 07/19/17 [Rx]


Losartan [Cozaar] 100 mg PO DAILY 07/19/17 [History]


traMADol [Ultram] 50 mg PO Q8H PRN #12 tablet 07/19/17 [Rx]


Prednisone [IMW: predniSONE] 40 mg PO WITHBREAKFAST #10 tab 08/29/17 [Rx]








Referrals: 


Richie Hurley MD [Primary Care Provider] - 





- Patient Data


Vitals - Most Recent: 


 Last Vital Signs











Temp  36.9 C   08/29/17 04:00


 


Pulse  101 H  08/29/17 04:00


 


Resp  18   08/29/17 04:00


 


BP  148/92 H  08/29/17 04:00


 


Pulse Ox  93 L  08/29/17 04:00











Weight - Most Recent: 127.459 kg


I&O - Last 24 hours: 


 Intake & Output











 08/28/17 08/28/17 08/29/17





 14:59 22:59 06:59


 


Intake Total 1000 500 700


 


Output Total  900 1450


 


Balance 1000 -400 -750











Lab Results - Last 24 hrs: 


 Laboratory Results - last 24 hr











  08/29/17 08/29/17 Range/Units





  05:43 05:43 


 


WBC  16.49 H   (4.0-11.0)  K/uL


 


RBC  4.68   (4.50-5.90)  M/uL


 


Hgb  14.0   (13.0-17.0)  g/dL


 


Hct  41.3   (38.0-50.0)  %


 


MCV  88.2   (80.0-98.0)  fL


 


MCH  29.9   (27.0-32.0)  pg


 


MCHC  33.9   (31.0-37.0)  g/dL


 


RDW Std Deviation  41.8   (28.0-62.0)  fl


 


RDW Coeff of Walter  13   (11.0-15.0)  %


 


Plt Count  373   (150-400)  K/uL


 


MPV  9.00   (7.40-12.00)  fL


 


Neut % (Auto)  94.4 H   (48.0-80.0)  %


 


Lymph % (Auto)  4.4 L   (16.0-40.0)  %


 


Mono % (Auto)  1.2   (0.0-15.0)  %


 


Eos % (Auto)  0.0   (0.0-7.0)  %


 


Baso % (Auto)  0.0   (0.0-1.5)  %


 


Neut # (Auto)  15.6 H   (1.4-5.7)  K/uL


 


Lymph # (Auto)  0.7   (0.6-2.4)  K/uL


 


Mono # (Auto)  0.2   (0.0-0.8)  K/uL


 


Eos # (Auto)  0.0   (0.0-0.7)  K/uL


 


Baso # (Auto)  0.0   (0.0-0.1)  K/uL


 


Nucleated RBC %  0.0   /100WBC


 


Nucleated RBCs #  0   K/uL


 


Sodium   141  (136-146)  mmol/L


 


Potassium   4.0  (3.5-5.1)  mmol/L


 


Chloride   110  ()  mmol/L


 


Carbon Dioxide   19 L  (21-31)  mmol/L


 


BUN   19  (6.0-23.0)  mg/dL


 


Creatinine   1.1  (0.6-1.5)  mg/dL


 


Est Cr Clr Drug Dosing   85.72  mL/min


 


Estimated GFR (MDRD)   > 60.0  ml/min


 


Glucose   148 H  ()  mg/dL


 


Calcium   10.2  (8.8-10.8)  mg/dL











Med Orders - Current: 


 Current Medications





Albuterol/Ipratropium (Duoneb 3.0-0.5 Mg/3 Ml)  3 ml NEB Q6HRRT [facility]


   Last Admin: 08/29/17 05:43 Dose:  3 ml


Albuterol/Ipratropium (Duoneb 3.0-0.5 Mg/3 Ml)  3 ml NEB Q2H PRN


   PRN Reason: Wheezing


Cephalexin (Keflex)  500 mg PO Q6H [facility]


   Last Admin: 08/29/17 05:55 Dose:  500 mg


Enoxaparin Sodium (Lovenox)  40 mg SUBCUT DAILY [facility]


Sodium Chloride (Normal Saline)  1,000 mls @ 125 mls/hr IV STAT [facility]


   Last Admin: 08/28/17 08:28 Dose:  125 mls/hr


Loratadine (Claritin)  10 mg PO DAILY [facility]


Losartan Potassium (Cozaar)  100 mg PO BEDTIME [facility]


   Last Admin: 08/28/17 22:20 Dose:  100 mg


Methylprednisolone Sodium Succinate (Solu-Medrol)  125 mg IVPUSH Q6H [facility]


   Last Admin: 08/29/17 02:46 Dose:  125 mg


Montelukast Sodium (Singulair)  10 mg PO DAILY [facility]


Patient Own Medication (Ptom)  1 each INH BIDRT [facility]


   Last Admin: 08/29/17 05:46 Dose:  1 each





Discontinued Medications





Albuterol/Ipratropium (Duoneb 3.0-0.5 Mg/3 Ml)  3 ml NEB ONETIME ONE


   Stop: 08/28/17 08:00


   Last Admin: 08/28/17 08:07 Dose:  3 ml


Albuterol/Ipratropium (Duoneb 3.0-0.5 Mg/3 Ml)  3 ml NEB ONETIME ONE


   Stop: 08/28/17 11:25


   Last Admin: 08/28/17 11:31 Dose:  3 ml


Losartan Potassium (Cozaar)  100 mg PO DAILY [facility]


Methylprednisolone Sodium Succinate (Solu-Medrol)  125 mg IVPUSH ONETIME ONE


   Stop: 08/28/17 08:00


   Last Admin: 08/28/17 09:11 Dose:  Not Given











*Q Meaningful Use (DIS)





- VTE *Q


VTE Criteria *Q: 








- Stroke *Q


Stroke Criteria *Q: 








- AMI *Q


AMI Criteria *Q:

## 2017-11-21 ENCOUNTER — HOSPITAL ENCOUNTER (EMERGENCY)
Dept: HOSPITAL 56 - MW.ED | Age: 47
Discharge: HOME | End: 2017-11-21
Payer: COMMERCIAL

## 2017-11-21 DIAGNOSIS — J45.901: Primary | ICD-10-CM

## 2017-11-21 DIAGNOSIS — I10: ICD-10-CM

## 2017-11-21 DIAGNOSIS — Z79.899: ICD-10-CM

## 2017-11-21 LAB
CHLORIDE SERPL-SCNC: 109 MMOL/L (ref 98–110)
SODIUM SERPL-SCNC: 140 MMOL/L (ref 136–146)

## 2017-11-21 PROCEDURE — 80053 COMPREHEN METABOLIC PANEL: CPT

## 2017-11-21 PROCEDURE — 85025 COMPLETE CBC W/AUTO DIFF WBC: CPT

## 2017-11-21 PROCEDURE — 99285 EMERGENCY DEPT VISIT HI MDM: CPT

## 2017-11-21 PROCEDURE — 71020: CPT

## 2017-11-21 PROCEDURE — 93005 ELECTROCARDIOGRAM TRACING: CPT

## 2017-11-21 PROCEDURE — 96374 THER/PROPH/DIAG INJ IV PUSH: CPT

## 2017-11-21 NOTE — EDM.PDOC
ED HPI GENERAL MEDICAL PROBLEM





- General


Chief Complaint: Respiratory Problem


Stated Complaint: ASTHMA/TROUBLE BREATHING


Time Seen by Provider: 11/21/17 19:46


Source of Information: Reports: Patient, Family


History Limitations: Reports: No Limitations





- History of Present Illness


INITIAL COMMENTS - FREE TEXT/NARRATIVE: 





HISTORY AND PHYSICAL:


[]47-year-old male presenting with shortness of breath this has been present 

for the last week





History of Present Illness:


[]Patient has history of asthma reactive airway


Hypertension


Peripheral edema


Patient's wife states that he has been sick every 6 weeks since moving here 

from [place], [place].





Review of Systems:


As per history of present illness and below otherwise all 


systems reviewed and negative.  





Past medical history:


As per history of present illness and as reviewed below


otherwise noncontributory.





Surgical history:


As per history of present illness and as reviewed below


otherwise noncontributory.





Social history:


No reported history of drug or alcohol abuse.





Family history:


As per history of present illness and as reviewed below


otherwise noncontributory.





Physical exam:


Alert and oriented male speaking in sentences of 4 words with breathiness. Wife 

is in room and answers many questions for him. He was last in urgent care 6 

weeks ago and treated for similar incident. He has a nebulizer machine at home 

for which he takes a DuoNeb.


HEENT: Atraumatic, normocehpalic, pupils reactive, negative for conjunctival 

pallor or scleral icterus, mucous membranes moist, throat clear, neck supple, 

nontender, trachea midline.  Wheezing present


Lungs: Wheezing present bilaterally, chest non tender.  


Heart: S1S2, regular, negative for clicks, rubs, or JVD.


Abdomen: Soft, nondistended, nontender.  Negative for masses or 

hepatossplenmegaly. Negative for costovertebral tenderness.


Pelvis: Stable nontender.


Genitourinary: Deferred.


Rectal: Deferred


Extremities: Atraumatic, negative for cords or calf pain.  


Neurovascular unremarkable.


Neuro:  Awake, alert, oriented.  Cranial nerves II through XII


unremarkable.  Cerebellum unremarkable.  Motor and sensory unremarkable 

throughout.  Exam nonfocal.  





Diagnostics:


[CBC CMP and BMP chest x-ray EKG ]





Therapeutics:


[]Dexamethasone IV


DuoNeb





Impression:


[Wheezing reactive airway]





Plan:


[]Discharged to home


Prescription for dexamethasone given


Follow-up with your primary care provider tomorrow





Definitive disposition and diagnosis as appropriate pending


reevaluation and review of above.  





Onset: Sudden


Duration: Chronic


Location: Reports: Chest


Severity: Moderate





- Related Data


 Allergies











Allergy/AdvReac Type Severity Reaction Status Date / Time


 


No Known Allergies Allergy   Verified 11/21/17 19:33











Home Meds: 


 Home Meds





Mometasone/Formoterol [Dulera 200-5 MCG] 2 puff IH BIDRT 09/06/16 [History]


Albuterol [Ventolin HFA] 2 inh INH Q4HR 04/03/17 [History]


Albuterol/Ipratropium [DuoNeb 3.0-0.5 MG/3 ML] 1 ampule PO ASDIRECTED 04/03/17 [

History]


Fluticasone Propionate [Flonase] 0 gm NASBOTH BID  bottle 04/04/17 [Rx]


Cephalexin [Keflex] 500 mg PO Q6HR #40 cap 07/19/17 [Rx]


Losartan [Cozaar] 100 mg PO DAILY 07/19/17 [History]


Dexamethasone 4 mg PO DAILY #5 tablet 11/21/17 [Rx]











Past Medical History


HEENT History: Reports: Impaired Vision


Other HEENT History: Patient reports farsightedness


Cardiovascular History: Reports: Hypertension


Respiratory History: Reports: Asthma


Gastrointestinal History: Reports: None


Genitourinary History: Reports: None, Retention, Urinary


Musculoskeletal History: Reports: None, Gout


Neurological History: Reports: None


Psychiatric History: Reports: None


Endocrine/Metabolic History: Reports: None, Obesity/BMI 30+


Hematologic History: Reports: None


Immunologic History: Reports: None


Oncologic (Cancer) History: Reports: None


Dermatologic History: Reports: None





- Infectious Disease History


Infectious Disease History: Reports: Chicken Pox





- Past Surgical History


HEENT Surgical History: Reports: Naso-Sinus Surgery, Polypectomy


Musculoskeletal Surgical History: Reports: None, Other (See Below)





Social & Family History





- Family History


Family Medical History: Noncontributory


Cardiac: Reports: Hypertension, MI


Respiratory: Reports: Asthma, Other (See Below)


Other Respiratory Family Hisory: Mother had lung CA. She was a heavy smoker.


Oncologic: Reports: Lung





- Tobacco Use


Smoking Status *Q: Never Smoker


Years of Tobacco use: 25


Packs/Tins Daily: 1 (1 tin per day)


Second Hand Smoke Exposure: No





- Caffeine Use


Caffeine Use: Reports: Tea


Caffeine Use Comment: Occasional coffee drinker





- Alcohol Use


Days Per Week of Alcohol Use: 0


Number of Drinks Per Day: 0


Total Drinks Per Week: 0





- Recreational Drug Use


Recreational Drug Use: No





- Living Situation & Occupation


Living situation: Reports: 


Occupation: Employed





ED ROS GENERAL





- Review of Systems


Review Of Systems: ROS reveals no pertinent complaints other than HPI.





ED EXAM, GENERAL





- Physical Exam


Exam: See Below (See dictation)





Course





- Vital Signs


Last Recorded V/S: 


 Last Vital Signs











Temp  36.7 C   11/21/17 19:25


 


Pulse  118 H  11/21/17 19:25


 


Resp  24 H  11/21/17 19:25


 


BP  164/100 H  11/21/17 19:25


 


Pulse Ox  92 L  11/21/17 19:25














- Orders/Labs/Meds


Orders: 


 Active Orders 24 hr











 Category Date Time Status


 


 EKG Documentation Completion [RC] STAT Care  11/21/17 19:45 Active


 


 RT Aerosol Therapy [RC] ASDIRECTED Care  11/21/17 19:30 Active


 


 Chest 2V [CR] Stat Exams  11/21/17 19:45 Taken


 


 Sodium Chloride 0.9% [Saline Flush] Med  11/21/17 19:45 Active





 10 ml FLUSH ASDIRECTED PRN   


 


 Sodium Chloride 0.9% [Saline Flush] Med  11/21/17 19:45 Active





 2.5 ml FLUSH ASDIRECTED PRN   


 


 Saline Lock Insert [OM.PC] Stat Oth  11/21/17 19:45 Ordered








 Medication Orders





Sodium Chloride (Saline Flush)  10 ml FLUSH ASDIRECTED PRN


   PRN Reason: Keep Vein Open


   Last Admin: 11/21/17 20:01  Dose: 10 ml


Sodium Chloride (Saline Flush)  2.5 ml FLUSH ASDIRECTED PRN


   PRN Reason: Keep Vein Open


   Last Admin: 11/21/17 20:01  Dose: 2.5 ml








Labs: 


 Laboratory Tests











  11/21/17 11/21/17 Range/Units





  20:02 20:02 


 


WBC  10.33   (4.0-11.0)  K/uL


 


RBC  5.09   (4.50-5.90)  M/uL


 


Hgb  15.3   (13.0-17.0)  g/dL


 


Hct  44.9   (38.0-50.0)  %


 


MCV  88.2   (80.0-98.0)  fL


 


MCH  30.1   (27.0-32.0)  pg


 


MCHC  34.1   (31.0-37.0)  g/dL


 


RDW Std Deviation  45.3   (28.0-62.0)  fl


 


RDW Coeff of Walter  14   (11.0-15.0)  %


 


Plt Count  315   (150-400)  K/uL


 


MPV  9.70   (7.40-12.00)  fL


 


Neut % (Auto)  58.5   (48.0-80.0)  %


 


Lymph % (Auto)  19.9   (16.0-40.0)  %


 


Mono % (Auto)  7.2   (0.0-15.0)  %


 


Eos % (Auto)  13.4 H   (0.0-7.0)  %


 


Baso % (Auto)  1.0   (0.0-1.5)  %


 


Neut # (Auto)  6.1 H   (1.4-5.7)  K/uL


 


Lymph # (Auto)  2.1   (0.6-2.4)  K/uL


 


Mono # (Auto)  0.7   (0.0-0.8)  K/uL


 


Eos # (Auto)  1.4 H   (0.0-0.7)  K/uL


 


Baso # (Auto)  0.1   (0.0-0.1)  K/uL


 


Nucleated RBC %  0.0   /100WBC


 


Nucleated RBCs #  0   K/uL


 


Sodium   140  (136-146)  mmol/L


 


Potassium   4.1  (3.5-5.1)  mmol/L


 


Chloride   109  ()  mmol/L


 


Carbon Dioxide   19 L  (21-31)  mmol/L


 


BUN   19  (6.0-23.0)  mg/dL


 


Creatinine   1.2  (0.6-1.5)  mg/dL


 


Est Cr Clr Drug Dosing   80.18  mL/min


 


Estimated GFR (MDRD)   > 60.0  ml/min


 


Glucose   98  ()  mg/dL


 


Calcium   10.0  (8.8-10.8)  mg/dL


 


Total Bilirubin   0.8  (0.1-1.5)  mg/dL


 


AST   24  (5-40)  IU/L


 


ALT   40  (8-54)  IU/L


 


Alkaline Phosphatase   104  ()  


 


Total Protein   7.5  (6.0-8.0)  g/dL


 


Albumin   4.3  (3.5-5.0)  g/dL


 


Globulin   3.2  (2.0-3.5)  g/dL


 


Albumin/Globulin Ratio   1.3  (1.3-2.8)  











Meds: 


Medications











Generic Name Dose Route Start Last Admin





  Trade Name Freq  PRN Reason Stop Dose Admin


 


Sodium Chloride  10 ml  11/21/17 19:45  11/21/17 20:01





  Saline Flush  FLUSH   10 ml





  ASDIRECTED PRN   Administration





  Keep Vein Open   


 


Sodium Chloride  2.5 ml  11/21/17 19:45  11/21/17 20:01





  Saline Flush  FLUSH   2.5 ml





  ASDIRECTED PRN   Administration





  Keep Vein Open   














Discontinued Medications














Generic Name Dose Route Start Last Admin





  Trade Name Freq  PRN Reason Stop Dose Admin


 


Albuterol/Ipratropium  3 ml  11/21/17 19:30  11/21/17 20:01





  Duoneb 3.0-0.5 Mg/3 Ml  NEB  11/21/17 19:31  3 ml





  ONETIME ONE   Administration


 


Dexamethasone  10 mg  11/21/17 19:45  11/21/17 20:01





  Dexamethasone  IVPUSH  11/21/17 19:46  10 mg





  ONETIME ONE   Administration














Departure





- Departure


Time of Disposition: 21:09


Disposition: Home, Self-Care 01


Condition: Good


Clinical Impression: 


Exacerbation of asthma


Qualifiers:


 Asthma severity: moderate Asthma persistence: unspecified Qualified Code(s): 

J45.901 - Unspecified asthma with (acute) exacerbation








- Discharge Information


Prescriptions: 


Dexamethasone 4 mg PO DAILY #5 tablet


Referrals: 


Richie Hurley MD [Primary Care Provider] - 


Forms:  ED Department Discharge


Additional Instructions: 


The following information is given to patients seen in the emergency department 

who are being discharged to home. This information is to outline your options 

for follow-up care. We provide all patients seen in our emergency department 

with a follow-up referral.





The need for follow-up, as well as the timing and circumstances, are variable 

depending upon the specifics of your emergency department visit.





If you don't have a primary care physician on staff, we will provide you with a 

referral. We always advise you to contact your personal physician following an 

emergency department visit to inform them of the circumstance of the visit and 

for follow-up with them and/or the need for any referrals to a consulting 

specialist.





The emergency department will also refer you to a specialist when appropriate. 

This referral assures that you have the opportunity for followup care with a 

specialist. All of these measure are taken in an effort to provide you with 

optimal care, which includes your followup.





Under all circumstances we always encourage you to contact your private 

physician who remains a resource for coordinating  your care. When calling for 

followup care, please make the office aware that this follow-up is from your 

recent emergency room visit. If for any reason you are refused follow-up, 

please contact the [facility] emergency department at [phone] 

and asked to speak to the emergency department charge nurse.


Follow-up with your primary care provider tomorrow prescription and has been 

given for dexamethasone 4 mg daily 5 days








- My Orders


Last 24 Hours: 


My Active Orders





11/21/17 19:45


EKG Documentation Completion [RC] STAT 


Chest 2V [CR] Stat 


Sodium Chloride 0.9% [Saline Flush]   10 ml FLUSH ASDIRECTED PRN 


Sodium Chloride 0.9% [Saline Flush]   2.5 ml FLUSH ASDIRECTED PRN 


Saline Lock Insert [OM.PC] Stat 














- Assessment/Plan


Last 24 Hours: 


My Active Orders





11/21/17 19:45


EKG Documentation Completion [RC] STAT 


Chest 2V [CR] Stat 


Sodium Chloride 0.9% [Saline Flush]   10 ml FLUSH ASDIRECTED PRN 


Sodium Chloride 0.9% [Saline Flush]   2.5 ml FLUSH ASDIRECTED PRN 


Saline Lock Insert [OM.PC] Stat

## 2017-11-22 VITALS — SYSTOLIC BLOOD PRESSURE: 156 MMHG | DIASTOLIC BLOOD PRESSURE: 90 MMHG

## 2017-11-22 NOTE — CR
EXAM DATE: 17



PATIENT'S AGE: 47





Patient: DENI KNOWLES



Facility: [place], [place]

Patient ID: [id number]

Site Patient ID: [id number].

Site Accession #: [id number]

: 1970

Study: XRay Chest [id number] 8:40:44 PM

Ordering Physician: Doctor Temp



Final Report: 

INDICATION: shortness of breath for 2 days, history of asthma 



TECHNIQUE: Chest radiograph 2 views 



COMPARISON: 17



FINDINGS: 

Cardiovascular and mediastinum: The cardiac silhouette is normal in appearance 
and size. Mediastinum is within normal limits. 



Lungs and pleural spaces: Both lungs are unremarkable in appearance. No sign of 
pleural effusion. No pneumothorax is seen. 



Bones and soft tissues: No significant findings. 



IMPRESSION:

1. No acute cardiopulmonary disease seen. 





Dictated by: Jarvis Jones MD @ 2017 20:58:03

(Electronic Signature)



Report Signed by Proxy.
JACKSON

## 2017-12-26 ENCOUNTER — HOSPITAL ENCOUNTER (INPATIENT)
Dept: HOSPITAL 56 - MW.ED | Age: 47
LOS: 2 days | Discharge: HOME | DRG: 203 | End: 2017-12-28
Attending: FAMILY MEDICINE | Admitting: FAMILY MEDICINE
Payer: COMMERCIAL

## 2017-12-26 DIAGNOSIS — Z79.899: ICD-10-CM

## 2017-12-26 DIAGNOSIS — R09.02: ICD-10-CM

## 2017-12-26 DIAGNOSIS — J09.X2: ICD-10-CM

## 2017-12-26 DIAGNOSIS — J45.901: Primary | ICD-10-CM

## 2017-12-26 DIAGNOSIS — I10: ICD-10-CM

## 2017-12-26 LAB
CHLORIDE SERPL-SCNC: 109 MMOL/L (ref 98–110)
SODIUM SERPL-SCNC: 141 MMOL/L (ref 136–146)

## 2017-12-26 NOTE — EDM.PDOC
ED HPI GENERAL MEDICAL PROBLEM





- General


Chief Complaint: Respiratory Problem


Stated Complaint: SOB


Time Seen by Provider: 12/26/17 19:15


Source of Information: Reports: Patient


History Limitations: Reports: No Limitations





- History of Present Illness


INITIAL COMMENTS - FREE TEXT/NARRATIVE: 





HISTORY AND PHYSICAL:





History of present illness:


Patient is a 47-year-old male who presents to the emergency room with 

complaints of cough 2 weeks. States as of recent he has had increased 

shortness of breath and decided to come to the emergency room. He does have a 

history of asthma she has a rescue inhaler, and has been using frequently. 

Complains of some left-sided rib pain when coughing. Denies any fever, chills, 

chest pain, abdominal pain, nausea, vomiting or diarrhea.


Denies any history of tobacco use.


Has not received the [id number] influenza vaccine





Review of systems: 


As per history of present illness and below otherwise all systems reviewed and 

negative.





Past medical history: 


As per history of present illness and as reviewed below otherwise 

noncontributory.





Surgical history: 


As per history of present illness and as reviewed below otherwise 

noncontributory.





Social history: 


No reported history of drug or alcohol abuse.





Family history: 


As per history of present illness and as reviewed below otherwise 

noncontributory.





Physical exam:


Gen.: Well-developed and well-nourished 47-year-old male. Alert and oriented. 

Nontoxic appearing.


HEENT: Atraumatic, normocephalic, pupils reactive, negative for conjunctival 

pallor or scleral icterus, mucous membranes moist, throat clear, neck supple, 

nontender, trachea midline.


Lungs: Poor air exchange with inspiratory and expiratory wheezing to the 

posterior bases bilaterally, chest nontender. Dry nonproductive cough noted.


Heart: S1S2, regular rate and rhythm without murmur


Abdomen: Soft, obese, nondistended, nontender. Negative for masses or 

hepatosplenomegaly. Negative for costovertebral tenderness.


Pelvis: Stable nontender.


Genitourinary: Deferred.


Rectal: Deferred.


Extremities: Atraumatic, moves all extremities per self and has full range of 

motion without deficits or difficulty. Neurovascular unremarkable.


Neuro: Awake, alert, oriented. Cranial nerves II through XII unremarkable. 

Cerebellum unremarkable. Motor and sensory unremarkable throughout. Exam 

nonfocal.





Oxygen saturation is 91-92% on room air. Patient is agreeable for her routine 

lab work and x-ray.


2020- CBC, CMP are normal. Influenza A is positive. Patient's oxygen saturation 

remains 90% on room air, after DuoNeb. An ambulating O2 sat 90% on room air. 

Dr. Painter was consulted on this case. We will admit this patient for 

observation for influenza A with hypoxemia. No antibiotics have been started at 

this time as the chest x-ray does not show any consolidation or bacterial 

pneumonia (Dr. Painter is agreeable)





Diagnostics:


CBC, CMP, blood cultures, chest x-ray





Therapeutics:


DuoNeb, IV Medrol, IV fluid





Impression: 


Influenza A with hypoxemia


History of asthma





Plan:


1. Observation admission





Definitive disposition and diagnosis as appropriate pending reevaluation and 

review of above.





Duration: Week(s):


  ** throat


Pain Score (Numeric/FACES): 8





- Related Data


 Allergies











Allergy/AdvReac Type Severity Reaction Status Date / Time


 


No Known Allergies Allergy   Verified 11/21/17 19:33











Home Meds: 


 Home Meds





Albuterol [Ventolin HFA] 2 inh INH Q4HR 04/03/17 [History]


Albuterol/Ipratropium [DuoNeb 3.0-0.5 MG/3 ML] 1 ampule PO ASDIRECTED 04/03/17 [

History]


Losartan [Cozaar] 100 mg PO DAILY 07/19/17 [History]











Past Medical History


HEENT History: Reports: Impaired Vision


Other HEENT History: Patient reports farsightedness


Cardiovascular History: Reports: Hypertension


Respiratory History: Reports: Asthma


Gastrointestinal History: Reports: None


Genitourinary History: Reports: None, Retention, Urinary


Musculoskeletal History: Reports: None, Gout


Neurological History: Reports: None


Psychiatric History: Reports: None


Endocrine/Metabolic History: Reports: None, Obesity/BMI 30+


Hematologic History: Reports: None


Immunologic History: Reports: None


Oncologic (Cancer) History: Reports: None


Dermatologic History: Reports: None





- Infectious Disease History


Infectious Disease History: Reports: Chicken Pox





- Past Surgical History


HEENT Surgical History: Reports: Naso-Sinus Surgery, Polypectomy


Musculoskeletal Surgical History: Reports: None, Other (See Below)





Social & Family History





- Family History


Family Medical History: Noncontributory


Cardiac: Reports: Hypertension, MI


Respiratory: Reports: Asthma, Other (See Below)


Other Respiratory Family Hisory: Mother had lung CA. She was a heavy smoker.


Oncologic: Reports: Lung





- Tobacco Use


Smoking Status *Q: Current Every Day Smoker


Years of Tobacco use: 20


Packs/Tins Daily: 1


Second Hand Smoke Exposure: No





- Caffeine Use


Caffeine Use: Reports: Tea


Caffeine Use Comment: Occasional coffee drinker





- Alcohol Use


Days Per Week of Alcohol Use: 0


Number of Drinks Per Day: 0


Total Drinks Per Week: 0





- Recreational Drug Use


Recreational Drug Use: No





- Living Situation & Occupation


Living situation: Reports: 


Occupation: Employed





ED ROS GENERAL





- Review of Systems


Review Of Systems: ROS reveals no pertinent complaints other than HPI.





ED EXAM, GENERAL





- Physical Exam


Exam: See Below (See dictation)





Course





- Vital Signs


Last Recorded V/S: 


 Last Vital Signs











Temp  100.7 F H  12/26/17 19:11


 


Pulse  128 H  12/26/17 19:11


 


Resp  22 H  12/26/17 19:11


 


BP  186/93 H  12/26/17 19:11


 


Pulse Ox  90 L  12/26/17 19:21














- Orders/Labs/Meds


Orders: 


 Active Orders 24 hr











 Category Date Time Status


 


 RT Aerosol Therapy [RC] ASDIRECTED Care  12/26/17 19:15 Active


 


 Chest 2V [CR] Stat Exams  12/26/17 19:15 Ordered


 


 CULTURE BLOOD [BC] Stat Lab  12/26/17 19:40 Received


 


 CULTURE BLOOD [BC] Stat Lab  12/26/17 19:55 Received


 


 CULTURE STREP A CONFIRMATION [RM] Stat Lab  12/26/17 19:40 Results


 


 STREP SCRN A RAPID W CULT CONF [RM] Stat Lab  12/26/17 19:40 Results


 


 Sodium Chloride 0.9% [Normal Saline] 1,000 ml Med  12/26/17 20:19 Ordered





 IV STAT   


 


 Blood Culture x2 Reflex Set [OM.PC] Stat Oth  12/26/17 19:16 Ordered








 Medication Orders





Sodium Chloride (Normal Saline)  1,000 mls @ 999 mls/hr IV STAT ONE


   Stop: 12/26/17 21:19








Labs: 


 Laboratory Tests











  12/26/17 12/26/17 Range/Units





  19:40 19:40 


 


WBC  7.82   (4.0-11.0)  K/uL


 


RBC  4.37 L   (4.50-5.90)  M/uL


 


Hgb  13.1   (13.0-17.0)  g/dL


 


Hct  38.8   (38.0-50.0)  %


 


MCV  88.8   (80.0-98.0)  fL


 


MCH  30.0   (27.0-32.0)  pg


 


MCHC  33.8   (31.0-37.0)  g/dL


 


RDW Std Deviation  43.9   (28.0-62.0)  fl


 


RDW Coeff of Walter  14   (11.0-15.0)  %


 


Plt Count  262   (150-400)  K/uL


 


MPV  9.10   (7.40-12.00)  fL


 


Add Manual Diff  YES   


 


Neutrophils % (Manual)  64   (48.0-80.0)  %


 


Band Neutrophils %  9   %


 


Lymphocytes % (Manual)  21   (16.0-40.0)  %


 


Monocytes % (Manual)  3   (0.0-15.0)  %


 


Eosinophils % (Manual)  1   (0.0-7.0)  %


 


Basophils % (Manual)  2 H   (0.0-1.5)  %


 


Nucleated RBC %  0.0   /100WBC


 


Absolute Seg Neuts  5.0   (1.4-5.7)  


 


Band Neutrophils #  0.7   


 


Lymphocytes # (Manual)  1.6   (0.6-2.4)  


 


Monocytes # (Manual)  0.2   (0.0-0.8)  


 


Eosinophils # (Manual)  0.1   (0.0-0.7)  


 


Basophils # (Manual)  0.2 H   (0.0-0.1)  


 


Nucleated RBCs #  0   K/uL


 


Sodium   141  (136-146)  mmol/L


 


Potassium   3.8  (3.5-5.1)  mmol/L


 


Chloride   109  ()  mmol/L


 


Carbon Dioxide   20 L  (21-31)  mmol/L


 


BUN   15  (6.0-23.0)  mg/dL


 


Creatinine   1.1  (0.6-1.5)  mg/dL


 


Est Cr Clr Drug Dosing   87.47  mL/min


 


Estimated GFR (MDRD)   > 60.0  ml/min


 


Glucose   132 H  ()  mg/dL


 


Calcium   8.8  (8.8-10.8)  mg/dL


 


Total Bilirubin   0.6  (0.1-1.5)  mg/dL


 


AST   20  (5-40)  IU/L


 


ALT   49  (8-54)  IU/L


 


Alkaline Phosphatase   107  ()  


 


Total Protein   6.9  (6.0-8.0)  g/dL


 


Albumin   3.9  (3.5-5.0)  g/dL


 


Globulin   3.0  (2.0-3.5)  g/dL


 


Albumin/Globulin Ratio   1.3  (1.3-2.8)  











Meds: 


Medications











Generic Name Dose Route Start Last Admin





  Trade Name Dalia  PRN Reason Stop Dose Admin


 


Sodium Chloride  1,000 mls @ 999 mls/hr  12/26/17 20:19  





  Normal Saline  IV  12/26/17 21:19  





  STAT ONE   














Discontinued Medications














Generic Name Dose Route Start Last Admin





  Trade Name Dalia  PRN Reason Stop Dose Admin


 


Acetaminophen  650 mg  12/26/17 20:16  





  Tylenol  PO  12/26/17 20:17  





  NOW ONE   


 


Albuterol/Ipratropium  3 ml  12/26/17 19:15  12/26/17 19:20





  Duoneb 3.0-0.5 Mg/3 Ml  NEB  12/26/17 19:16  3 ml





  ONETIME ONE   Administration


 


Methylprednisolone Sodium Succinate  125 mg  12/26/17 20:09  





  Solu-Medrol  IVPUSH  12/26/17 20:10  





  ONETIME ONE   














Departure





- Departure


Time of Disposition: 20:26


Disposition: Home, Self-Care 01


Clinical Impression: 


 Influenza A, Hypoxemia








- Discharge Information


Referrals: 


Richie Hurley MD [Primary Care Provider] - 


Forms:  ED Department Discharge





- My Orders


Last 24 Hours: 


My Active Orders





12/26/17 19:15


RT Aerosol Therapy [RC] ASDIRECTED 


Chest 2V [CR] Stat 





12/26/17 19:16


Blood Culture x2 Reflex Set [OM.PC] Stat 





12/26/17 19:40


CULTURE BLOOD [BC] Stat 


CULTURE STREP A CONFIRMATION [RM] Stat 


STREP SCRN A RAPID W CULT CONF [RM] Stat 





12/26/17 19:55


CULTURE BLOOD [BC] Stat 





12/26/17 20:19


Sodium Chloride 0.9% [Normal Saline] 1,000 ml IV STAT 














- Assessment/Plan


Last 24 Hours: 


My Active Orders





12/26/17 19:15


RT Aerosol Therapy [RC] ASDIRECTED 


Chest 2V [CR] Stat 





12/26/17 19:16


Blood Culture x2 Reflex Set [OM.PC] Stat 





12/26/17 19:40


CULTURE BLOOD [BC] Stat 


CULTURE STREP A CONFIRMATION [RM] Stat 


STREP SCRN A RAPID W CULT CONF [RM] Stat 





12/26/17 19:55


CULTURE BLOOD [BC] Stat 





12/26/17 20:19


Sodium Chloride 0.9% [Normal Saline] 1,000 ml IV STAT

## 2017-12-26 NOTE — PCM.HP
H&P History of Present Illness





- General


Date of Service: 12/26/17


Admit Problem/Dx: 


 Admission Diagnosis/Problem





Admission Diagnosis/Problem      Influenza due to influenza A virus








Source of Information: Patient, Family


History Limitations: Reports: No Limitations





- History of Present Illness


Initial Comments - Free Text/Narative: 


47-year-old male presenting to the emergency department with chief complaint of 

shortness of breath and cough 2 weeks with past medical history of moderate 

asthma.





Patient presented to the emergency department with his wife with chief 

complaint of increasing shortness of breath and cough 2 weeks. His wife states 

that he began to feel ill around Thanksgiving and has not completely improved 

since then. He has gotten better then worse but over the last 2 weeks his 

breathing has become more impaired with associated cough and wheezing. Patient 

has a history of asthma for which he has a daily inhaler as well as a rescue 

inhaler. He also has a home nebulizer. He does have some left-sided rib pain 

associated with coughing. Patient denies any chest pain, palpitations, syncopal 

episodes, focal neurologic deficits, fever, chills, nausea, vomiting, or 

diarrhea. Patient did not receive the influenza vaccine this year. He denies 

history of smoking.





In the emergency department CBC and CMP were unremarkable. Rapid strep was 

negative. Patient was positive for influenza A. Chest x-ray was unremarkable.





Patient was admitted to observation for influenza A with dyspnea.





  ** throat


Pain Score (Numeric/FACES): 8





- Related Data


Allergies/Adverse Reactions: 


 Allergies











Allergy/AdvReac Type Severity Reaction Status Date / Time


 


No Known Allergies Allergy   Verified 11/21/17 19:33











Home Medications: 


 Home Meds





Albuterol [Ventolin HFA] 2 inh INH Q4HR 04/03/17 [History]


Albuterol/Ipratropium [DuoNeb 3.0-0.5 MG/3 ML] 1 ampule PO ASDIRECTED 04/03/17 [

History]


Losartan [Cozaar] 100 mg PO DAILY 07/19/17 [History]











Past Medical History


HEENT History: Reports: Impaired Vision


Other HEENT History: Patient reports farsightedness


Cardiovascular History: Reports: Hypertension


Respiratory History: Reports: Asthma


Gastrointestinal History: Reports: None


Genitourinary History: Reports: None, Retention, Urinary


Musculoskeletal History: Reports: None, Gout


Neurological History: Reports: None


Psychiatric History: Reports: None


Endocrine/Metabolic History: Reports: None, Obesity/BMI 30+


Hematologic History: Reports: None


Immunologic History: Reports: None


Oncologic (Cancer) History: Reports: None


Dermatologic History: Reports: None





- Infectious Disease History


Infectious Disease History: Reports: Chicken Pox





- Past Surgical History


HEENT Surgical History: Reports: Naso-Sinus Surgery, Polypectomy


Musculoskeletal Surgical History: Reports: None, Other (See Below)





Social & Family History





- Family History


Family Medical History: Noncontributory


Cardiac: Reports: Hypertension, MI


Respiratory: Reports: Asthma, Other (See Below)


Other Respiratory Family Hisory: Mother had lung CA. She was a heavy smoker.


Oncologic: Reports: Lung





- Tobacco Use


Smoking Status *Q: Current Every Day Smoker


Years of Tobacco use: 20


Packs/Tins Daily: 1


Second Hand Smoke Exposure: No





- Caffeine Use


Caffeine Use: Reports: Tea


Caffeine Use Comment: Occasional coffee drinker





- Alcohol Use


Days Per Week of Alcohol Use: 0


Number of Drinks Per Day: 0


Total Drinks Per Week: 0





- Recreational Drug Use


Recreational Drug Use: No





- Living Situation & Occupation


Living situation: Reports: 


Occupation: Employed





H&P Review of Systems





- Review of Systems:


Review Of Systems: See Below


General: Reports: Malaise.  Denies: Fever, Chills, Weakness, Fatigue, 

Diaphoresis


HEENT: Reports: Sore Throat.  Denies: Dysphasia, Headaches


Pulmonary: Reports: Shortness of Breath, Wheezing, Pleuritic Chest Pain, Cough.

  Denies: Sputum


Cardiovascular: Denies: Chest Pain, Palpitations, Edema


Gastrointestinal: Denies: Abdominal Pain, Black Stool, Bloody Stool, Nausea, 

Vomiting


Genitourinary: Denies: Dysuria, Hematuria


Musculoskeletal: Denies: Neck Pain, Leg Pain


Skin: Denies: Cyanosis


Psychiatric: Denies: Confusion


Neurological: Denies: Confusion, Dizziness, Headache





Exam





- Exam


Exam: See Below





- Vital Signs


Vital Signs: 


 Last Vital Signs











Temp  100.7 F H  12/26/17 19:11


 


Pulse  128 H  12/26/17 19:11


 


Resp  22 H  12/26/17 19:11


 


BP  186/93 H  12/26/17 19:11


 


Pulse Ox  90 L  12/26/17 19:21











Weight: 285 kg





- Exam


Quality Assessment: Supplemental Oxygen, DVT Prophylaxis


General: Alert, Oriented, Cooperative


HEENT: Conjunctiva Clear, EACs Clear, EOMI, Hearing Intact, Mucosa Moist & Pink

, Nares Patent, Normal Nasal Septum, Posterior Pharynx Clear, PERRLA


Neck: Supple, Trachea Midline, 2


Lungs: Normal Respiratory Effort, Decreased Breath Sounds, Wheezing


Cardiovascular: Regular Rhythm, Normal S1, Normal S2, Tachycardia


GI/Abdominal Exam: Normal Bowel Sounds, Soft, Non-Tender, No Organomegaly, No 

Distention


Back Exam: Normal Inspection


Extremities: Normal Inspection, Non-Tender, No Pedal Edema, Normal Capillary 

Refill


Peripheral Pulses: 2+: Radial (L), Radial (R), Posterior Tibial (L), Posterior 

Tibial (R), Dorsalis Pedis (L), Dorsalis Pedis (R)


Skin: Warm, Dry, Intact


Neurological: Cranial Nerves Intact


Neuro Extensive - Mental Status: Alert, Oriented x3, Normal Mood/Affect, Normal 

Cognition


Neuro Extensive - Motor, Sensory, Reflexes: CN II-XII Intact


Psychiatric: Alert, Normal Affect, Normal Mood





- Patient Data


Lab Results Last 24 hrs: 


 Laboratory Results - last 24 hr











  12/26/17 12/26/17 Range/Units





  19:40 19:40 


 


WBC  7.82   (4.0-11.0)  K/uL


 


RBC  4.37 L   (4.50-5.90)  M/uL


 


Hgb  13.1   (13.0-17.0)  g/dL


 


Hct  38.8   (38.0-50.0)  %


 


MCV  88.8   (80.0-98.0)  fL


 


MCH  30.0   (27.0-32.0)  pg


 


MCHC  33.8   (31.0-37.0)  g/dL


 


RDW Std Deviation  43.9   (28.0-62.0)  fl


 


RDW Coeff of Walter  14   (11.0-15.0)  %


 


Plt Count  262   (150-400)  K/uL


 


MPV  9.10   (7.40-12.00)  fL


 


Add Manual Diff  YES   


 


Neutrophils % (Manual)  64   (48.0-80.0)  %


 


Band Neutrophils %  9   %


 


Lymphocytes % (Manual)  21   (16.0-40.0)  %


 


Monocytes % (Manual)  3   (0.0-15.0)  %


 


Eosinophils % (Manual)  1   (0.0-7.0)  %


 


Basophils % (Manual)  2 H   (0.0-1.5)  %


 


Nucleated RBC %  0.0   /100WBC


 


Absolute Seg Neuts  5.0   (1.4-5.7)  


 


Band Neutrophils #  0.7   


 


Lymphocytes # (Manual)  1.6   (0.6-2.4)  


 


Monocytes # (Manual)  0.2   (0.0-0.8)  


 


Eosinophils # (Manual)  0.1   (0.0-0.7)  


 


Basophils # (Manual)  0.2 H   (0.0-0.1)  


 


Nucleated RBCs #  0   K/uL


 


Sodium   141  (136-146)  mmol/L


 


Potassium   3.8  (3.5-5.1)  mmol/L


 


Chloride   109  ()  mmol/L


 


Carbon Dioxide   20 L  (21-31)  mmol/L


 


BUN   15  (6.0-23.0)  mg/dL


 


Creatinine   1.1  (0.6-1.5)  mg/dL


 


Est Cr Clr Drug Dosing   87.47  mL/min


 


Estimated GFR (MDRD)   > 60.0  ml/min


 


Glucose   132 H  ()  mg/dL


 


Calcium   8.8  (8.8-10.8)  mg/dL


 


Total Bilirubin   0.6  (0.1-1.5)  mg/dL


 


AST   20  (5-40)  IU/L


 


ALT   49  (8-54)  IU/L


 


Alkaline Phosphatase   107  ()  


 


Total Protein   6.9  (6.0-8.0)  g/dL


 


Albumin   3.9  (3.5-5.0)  g/dL


 


Globulin   3.0  (2.0-3.5)  g/dL


 


Albumin/Globulin Ratio   1.3  (1.3-2.8)  











Result Diagrams: 


 12/26/17 19:40





 12/26/17 19:40


Tenzin Results Last 24 hrs: 


 Microbiology











 12/26/17 19:30 Influenza Type A Antigen Screen - Final





 Nasopharyngeal Swab    Positive Influenza A Ag





 Influenza Type B Antigen Screen - Final





    NEGATIVE INFLUENZA B VIRUS AG


 


 12/26/17 19:40 Group A Streptococcus Rapid Screen - Final





 Throat    NEGATIVE STREP A SCREEN














*Q Meaningful Use (ADM)





- VTE *Q


VTE Criteria *Q: 








- Stroke *Q


Stroke Criteria *Q: 








- AMI *Q


AMI Criteria *Q: 








- Problem List


(1) Moderate persistent asthma


SNOMED Code(s): 666724783


   ICD Code: J45.40 - MODERATE PERSISTENT ASTHMA, UNCOMPLICATED   Status: 

Chronic   Priority: High   Current Visit: Yes   


Qualifiers: 


   Asthma complication type: uncomplicated   Qualified Code(s): J45.40 - 

Moderate persistent asthma, uncomplicated   





(2) Influenza A


SNOMED Code(s): 129479110


   ICD Code: J10.1 - FLU DUE TO OTH IDENT INFLUENZA VIRUS W OTH RESP MANIFEST   

Status: Acute   Priority: High   Current Visit: Yes   





(3) HTN (hypertension)


SNOMED Code(s): 97290290


   Status: Chronic   Priority: Low   Current Visit: Yes   


Qualifiers: 


   Hypertension type: essential hypertension   Qualified Code(s): I10 - 

Essential (primary) hypertension   


Problem List Initiated/Reviewed/Updated: Yes


Orders Last 24hrs: 


 Active Orders 24 hr











 Category Date Time Status


 


 Admission Status [Patient Status] [ADT] Stat ADT  12/26/17 20:27 Active


 


 RT Aerosol Therapy [RC] ASDIRECTED Care  12/26/17 19:15 Active


 


 Chest 2V [CR] Stat Exams  12/26/17 19:15 Taken


 


 CULTURE BLOOD [BC] Stat Lab  12/26/17 19:40 Received


 


 CULTURE BLOOD [BC] Stat Lab  12/26/17 19:55 Received


 


 CULTURE STREP A CONFIRMATION [RM] Stat Lab  12/26/17 19:40 Results


 


 STREP SCRN A RAPID W CULT CONF [RM] Stat Lab  12/26/17 19:40 Results


 


 Sodium Chloride 0.9% [Normal Saline] 1,000 ml Med  12/26/17 20:19 Active





 IV STAT   


 


 Blood Culture x2 Reflex Set [OM.PC] Stat Oth  12/26/17 19:16 Ordered








 Medication Orders





Sodium Chloride (Normal Saline)  1,000 mls @ 999 mls/hr IV STAT ONE


   Stop: 12/26/17 21:19


   Last Admin: 12/26/17 20:39  Dose: 999 mls/hr








Assessment/Plan Comment:: 


47-year-old male admitted 12/26/174 influenza A with past medical history of 

moderate persistent asthma and hypertension.





Influenza A: We'll treat with Tamiflu 75 mg by mouth daily 5 days





Moderate persistent asthma: Currently exacerbated by influenza. Will treat with 

DuoNeb nebs and Solu-Medrol.





Hypertension: Currently stable we'll resume home meds.





VTE proph: Lovenox, SCD





Dispo: 1-2 days

## 2017-12-27 LAB
CHLORIDE SERPL-SCNC: 107 MMOL/L (ref 98–110)
SODIUM SERPL-SCNC: 140 MMOL/L (ref 136–146)

## 2017-12-27 RX ADMIN — METHYLPREDNISOLONE SODIUM SUCCINATE SCH MG: 125 INJECTION, POWDER, FOR SOLUTION INTRAMUSCULAR; INTRAVENOUS at 20:41

## 2017-12-27 RX ADMIN — METHYLPREDNISOLONE SODIUM SUCCINATE SCH MG: 125 INJECTION, POWDER, FOR SOLUTION INTRAMUSCULAR; INTRAVENOUS at 15:03

## 2017-12-27 NOTE — PCM.PN
- General Info


Date of Service: 12/27/17


Admission Dx/Problem (Free Text): 


 Admission Diagnosis/Problem





Admission Diagnosis/Problem      Influenza due to influenza A virus








Subjective Update: 





Patient currently on 3L O2. Still complaining of sob, cough and pleuritic chest 

pain. 


Functional Status: Reports: Tolerating Diet, Ambulating, Urinating





- Review of Systems


General: Reports: Malaise


HEENT: Reports: No Symptoms


Pulmonary: Reports: Shortness of Breath, Pleuritic Chest Pain, Cough


Cardiovascular: Reports: No Symptoms


Gastrointestinal: Reports: No Symptoms


Genitourinary: Reports: No Symptoms


Musculoskeletal: Reports: No Symptoms


Skin: Reports: No Symptoms


Neurological: Reports: No Symptoms


Psychiatric: Reports: No Symptoms





- Patient Data


Vitals - Most Recent: 


 Last Vital Signs











Temp  36.3 C   12/27/17 04:00


 


Pulse  108 H  12/27/17 04:00


 


Resp  22 H  12/27/17 04:00


 


BP  158/92 H  12/27/17 04:00


 


Pulse Ox  94 L  12/27/17 04:00











Weight - Most Recent: 285 kg


I&O - Last 24 Hours: 


 Intake & Output











 12/26/17 12/27/17 12/27/17





 22:59 06:59 14:59


 


Intake Total  700 


 


Output Total  1000 


 


Balance  -300 











Lab Results Last 24 Hours: 


 Laboratory Results - last 24 hr











  12/27/17 12/27/17 Range/Units





  04:46 04:46 


 


WBC  6.93   (4.0-11.0)  K/uL


 


RBC  4.64   (4.50-5.90)  M/uL


 


Hgb  13.8   (13.0-17.0)  g/dL


 


Hct  41.2   (38.0-50.0)  %


 


MCV  88.8   (80.0-98.0)  fL


 


MCH  29.7   (27.0-32.0)  pg


 


MCHC  33.5   (31.0-37.0)  g/dL


 


RDW Std Deviation  44.2   (28.0-62.0)  fl


 


RDW Coeff of Walter  14   (11.0-15.0)  %


 


Plt Count  335   (150-400)  K/uL


 


MPV  9.30   (7.40-12.00)  fL


 


Neut % (Auto)  93.0 H   (48.0-80.0)  %


 


Lymph % (Auto)  4.2 L   (16.0-40.0)  %


 


Mono % (Auto)  2.6   (0.0-15.0)  %


 


Eos % (Auto)  0.1   (0.0-7.0)  %


 


Baso % (Auto)  0.1   (0.0-1.5)  %


 


Neut # (Auto)  6.4 H   (1.4-5.7)  K/uL


 


Lymph # (Auto)  0.3 L   (0.6-2.4)  K/uL


 


Mono # (Auto)  0.2   (0.0-0.8)  K/uL


 


Eos # (Auto)  0.0   (0.0-0.7)  K/uL


 


Baso # (Auto)  0.0   (0.0-0.1)  K/uL


 


Nucleated RBC %  0.0   /100WBC


 


Nucleated RBCs #  0   K/uL


 


Sodium   140  (136-146)  mmol/L


 


Potassium   4.4  (3.5-5.1)  mmol/L


 


Chloride   107  ()  mmol/L


 


Carbon Dioxide   21  (21-31)  mmol/L


 


BUN   14  (6.0-23.0)  mg/dL


 


Creatinine   1.1  (0.6-1.5)  mg/dL


 


Est Cr Clr Drug Dosing   87.47  mL/min


 


Estimated GFR (MDRD)   > 60.0  ml/min


 


Glucose   157 H  ()  mg/dL


 


Calcium   9.5  (8.8-10.8)  mg/dL











Med Orders - Current: 


 Current Medications





Acetaminophen (Tylenol)  650 mg PO Q4H PRN


   PRN Reason: Pain (Mild 1-3)/fever


   Last Admin: 12/27/17 06:54 Dose:  650 mg


Albuterol (Proventil Hfa)  0 gm INH Q4H PRN


   PRN Reason: cough, sob


Albuterol/Ipratropium (Duoneb 3.0-0.5 Mg/3 Ml)  3 ml NEB Q4HRRT [facility]


Benzonatate (Tessalon Perles)  100 mg PO TID PRN


   PRN Reason: Cough


Enoxaparin Sodium (Lovenox)  40 mg SUBCUT DAILY@2000 [facility]


   Last Admin: 12/26/17 22:00 Dose:  40 mg


Ketorolac Tromethamine (Toradol)  30 mg IVPUSH ONETIME ONE


   Stop: 12/27/17 09:46


Methylprednisolone Sodium Succinate (Solu-Medrol)  125 mg IVPUSH Q6H [facility]


Ondansetron HCl (Zofran Odt)  4 mg PO Q4H PRN


   PRN Reason: nausea, able to take PO


Oseltamivir Phosphate (Tamiflu)  75 mg PO BID [facility]





Discontinued Medications





Acetaminophen (Tylenol)  650 mg PO NOW ONE


   Stop: 12/26/17 20:17


   Last Admin: 12/26/17 20:41 Dose:  Not Given


Albuterol/Ipratropium (Duoneb 3.0-0.5 Mg/3 Ml)  3 ml NEB ONETIME ONE


   Stop: 12/26/17 19:16


   Last Admin: 12/26/17 19:20 Dose:  3 ml


Albuterol/Ipratropium (Duoneb 3.0-0.5 Mg/3 Ml)  3 ml NEB Q6HRRT [facility]


   Last Admin: 12/27/17 06:48 Dose:  3 ml


Sodium Chloride (Normal Saline)  1,000 mls @ 999 mls/hr IV STAT ONE


   Stop: 12/26/17 21:19


   Last Admin: 12/26/17 20:39 Dose:  999 mls/hr


Methylprednisolone Sodium Succinate (Solu-Medrol)  125 mg IVPUSH ONETIME ONE


   Stop: 12/26/17 20:10


   Last Admin: 12/26/17 20:39 Dose:  125 mg


Methylprednisolone Sodium Succinate (Solu-Medrol)  125 mg IVPUSH BID [facility]


   Last Admin: 12/27/17 09:21 Dose:  125 mg


Oseltamivir Phosphate (Tamiflu)  75 mg PO DAILY [facility]


   Last Admin: 12/27/17 09:21 Dose:  75 mg











- Exam


Quality Assessment: Supplemental Oxygen


General: Alert, Oriented


HEENT: Pupils Equal, Pupils Reactive


Neck: Supple, No JVD


Lungs: Decreased Breath Sounds, Wheezing


Cardiovascular: Regular Rate, Tachycardia


GI/Abdominal Exam: Normal Bowel Sounds, Soft, Non-Tender, No Distention


Back Exam: Normal Inspection


Extremities: Normal Inspection, No Pedal Edema


Peripheral Pulses: 2+: Radial (L), Radial (R)


Skin: Warm, Dry, Intact


Neurological: No New Focal Deficit


Psy/Mental Status: Alert, Normal Affect, Normal Mood





- Problem List Review


Problem List Initiated/Reviewed/Updated: Yes





- My Orders


Last 24 Hours: 


My Active Orders





12/27/17 09:41


Albuterol [Proventil HFA]   See Dose Instructions  INH Q4H PRN 





12/27/17 09:42


RT Post Treatment Assessment [RC] Click to Edit 


RT Pre-Treatment Assessment [RC] Click to Edit 





12/27/17 09:45


Benzonatate [Tessalon Perles]   100 mg PO TID PRN 


Ketorolac [Toradol]   30 mg IVPUSH ONETIME ONE 





12/27/17 09:46


methylPREDNISolone Sod Succ [Solu-MEDROL]   125 mg IVPUSH Q6H 





12/27/17 10:00


Albuterol/Ipratropium [DuoNeb 3.0-0.5 MG/3 ML]   3 ml NEB Q4HRRT 





12/27/17 21:00


Oseltamivir [Tamiflu]   75 mg PO BID 














- Plan


Plan:: 


47-year-old male with history of HTN and moderate persistent asthma admitted 

for acute influenza A with acute asthma exacerbation and hypoxia





#Influenza A


-on Tamiflu 75 mg BID


-hypoxia, diminished breath sounds with poor air movement, pleuritis


plan:


-continue Tamiflu


-administer Ketorolac 30 mg IV single dose followed by Ibuprofen PRN for 

pleuritic pain


-tessalon pearls prn for cough





#Acute Asthma Exacerbation, secondary to Influenza 


-increase duonebs from q6h to q4h


-increase Solumedrol 125 mg IV from BID to d2ithlh


-as per patients request, resume home proventil PRN for sob and cough





#Hypoxia


-likely multifactorial, secondary to influenza and asthma


-currently on 3L O2


plan:


-wean O2


-as per above 


 


#Hypertension


-resume home Losartan 100 mg QD





VTE proph: Lovenox, SCD





Dispo: 1-2 days pending improvement

## 2017-12-27 NOTE — CR
EXAM DATE: 17



PATIENT'S AGE: 47





Patient: DENI KNOWLES



Facility: [place], [place]

Patient ID: [id number]

Site Patient ID: [id number].

Site Accession #: [id number]

: 1970

Study: XRay Chest [id number]/26/2017 8:15:12 PM

Ordering Physician: Doctor Temp



Final Report: 

CHEST 2 VIEWS



INDICATION:

Cough. 



IMPRESSION:

Normal heart size and vascular pattern.

Lungs are clear.

No pneumothorax or pleural abnormality.





Dictated by Andrés De León MD @ Dec 26 2017 9:46PM

(Electronic Signature)



Report Signed by Proxy.
[facility]

## 2017-12-28 VITALS — DIASTOLIC BLOOD PRESSURE: 75 MMHG | SYSTOLIC BLOOD PRESSURE: 138 MMHG

## 2017-12-28 LAB
CHLORIDE SERPL-SCNC: 108 MMOL/L (ref 98–110)
SODIUM SERPL-SCNC: 142 MMOL/L (ref 136–146)

## 2017-12-28 RX ADMIN — METHYLPREDNISOLONE SODIUM SUCCINATE SCH MG: 125 INJECTION, POWDER, FOR SOLUTION INTRAMUSCULAR; INTRAVENOUS at 02:25

## 2017-12-28 RX ADMIN — METHYLPREDNISOLONE SODIUM SUCCINATE SCH MG: 125 INJECTION, POWDER, FOR SOLUTION INTRAMUSCULAR; INTRAVENOUS at 08:14

## 2017-12-28 NOTE — PCM.DCSUM1
**Discharge Summary





- Hospital Course


Free Text/Narrative:: 





47 year old male was admitted for on 12/26 for Hypoxia, Acute asthma 

exacerbation and Influenza. He was treated with Tamiflu, Solumedrol and 

duonebs. His home inhalers were also continued. He improved as expected. He was 

discharged home on 12/28/17. f/u with his PCP was arranged. His wife who was at 

bedside during most of hospitalization was prescribed phophylactic tamiflu.





Discharge Diagnosis/Plan





#1. Acute Asthma Exacerbation, improved


-discharged on Prednisone 40 mg QD for 5 days 


-patient states he is on Albuterol and Advair which he was instructed to 

continue 


-f/u with pcp in 2 weeks 





#2. Influenza, improved 


-discharged home on Tamiflu 75 mg BID for 4 more days to equal total 7 days (7 

day duration chosen due to severity of symptoms)


-wife given prescription for prophylactic tamiflu





#3. Hypoxia, resolved


-plan as per above 





- Discharge Data


Discharge Date: 12/28/17


Discharge Disposition: Home, Self-Care 01


Condition: Good





- Patient Instructions


Diet: Usual Diet as Tolerated


Activity: As Tolerated


Activity, Other: home from work until 1/1/18





- Discharge Plan


Prescriptions/Med Rec: 


Benzonatate [Tessalon Perles] 100 mg PO TID PRN #30 cap


 PRN Reason: Cough


Oseltamivir Phosphate [IJD: Tamiflu] 75 mg PO BID #9 capsule


Prednisone [IJD: predniSONE] 40 mg PO WITHBREAKFAST #10 tab


Home Medications: 


 Home Meds





Albuterol [Ventolin HFA] 2 inh INH Q4HR PRN 04/03/17 [History]


Albuterol/Ipratropium [DuoNeb 3.0-0.5 MG/3 ML] 1 ampule PO ASDIRECTED 04/03/17 [

History]


Celecoxib 200 mg PO DAILY PRN 12/27/17 [History]


Colchicine [Colcrys] 0.6 mg PO ASDIRECTED PRN 12/27/17 [History]


Fluticasone/Vilanterol [Breo Ellipta 200-25 Mcg INH] 1 each IH DAILY 12/27/17 [

History]


Indomethacin 50 mg PO TID PRN 12/27/17 [History]


Losartan/Hydrochlorothiazide [Losartan-HCTZ 50-12.5 MG] 1 each PO BEDTIME 12/27/ 17 [History]


Mometasone/Formoterol [Dulera 200-5 MCG] 2 puff IH BID 12/27/17 [History]


Umeclidinium Bromide [Incruse Ellipta*] 62.5 mcg IH DAILY 12/27/17 [History]


Benzonatate [Tessalon Perles] 100 mg PO TID PRN #30 cap 12/28/17 [Rx]


Oseltamivir Phosphate [IJD: Tamiflu] 75 mg PO BID #9 capsule 12/28/17 [Rx]


Prednisone [IJD: predniSONE] 40 mg PO WITHBREAKFAST #10 tab 12/28/17 [Rx]








Patient Handouts:  Influenza, Adult, Easy-to-Read


Referrals: 


Richie Hurley MD [Primary Care Provider] - 01/05/18 1:00 pm





- Discharge Summary/Plan Comment


DC Time >30 min.: No





- Review of Systems


General: Reports: No Symptoms


HEENT: Reports: No Symptoms


Pulmonary: Reports: No Symptoms


Cardiovascular: Reports: No Symptoms


Gastrointestinal: Reports: No Symptoms


Genitourinary: Reports: No Symptoms


Musculoskeletal: Reports: No Symptoms


Skin: Reports: No Symptoms


Neurological: Reports: No Symptoms


Psychiatric: Reports: No Symptoms





- Patient Data


Vitals - Most Recent: 


 Last Vital Signs











Temp  36.3 C   12/28/17 08:00


 


Pulse  112 H  12/28/17 08:00


 


Resp  20   12/28/17 08:00


 


BP  138/75   12/28/17 08:00


 


Pulse Ox  93 L  12/28/17 08:00











Weight - Most Recent: 125.1 kg


I&O - Last 24 hours: 


 Intake & Output











 12/27/17 12/28/17 12/28/17





 22:59 06:59 14:59


 


Intake Total 1100 1210 


 


Output Total  1300 


 


Balance 1100 -90 











Lab Results - Last 24 hrs: 


 Laboratory Results - last 24 hr











  12/28/17 12/28/17 Range/Units





  04:50 04:50 


 


WBC  11.66 H   (4.0-11.0)  K/uL


 


RBC  4.79   (4.50-5.90)  M/uL


 


Hgb  14.3   (13.0-17.0)  g/dL


 


Hct  42.8   (38.0-50.0)  %


 


MCV  89.4   (80.0-98.0)  fL


 


MCH  29.9   (27.0-32.0)  pg


 


MCHC  33.4   (31.0-37.0)  g/dL


 


RDW Std Deviation  44.8   (28.0-62.0)  fl


 


RDW Coeff of Walter  14   (11.0-15.0)  %


 


Plt Count  358   (150-400)  K/uL


 


MPV  9.30   (7.40-12.00)  fL


 


Neut % (Auto)  91.5 H   (48.0-80.0)  %


 


Lymph % (Auto)  4.2 L   (16.0-40.0)  %


 


Mono % (Auto)  4.3   (0.0-15.0)  %


 


Eos % (Auto)  0.0   (0.0-7.0)  %


 


Baso % (Auto)  0.0   (0.0-1.5)  %


 


Neut # (Auto)  10.7 H   (1.4-5.7)  K/uL


 


Lymph # (Auto)  0.5 L   (0.6-2.4)  K/uL


 


Mono # (Auto)  0.5   (0.0-0.8)  K/uL


 


Eos # (Auto)  0.0   (0.0-0.7)  K/uL


 


Baso # (Auto)  0.0   (0.0-0.1)  K/uL


 


Nucleated RBC %  0.0   /100WBC


 


Nucleated RBCs #  0   K/uL


 


Sodium   142  (136-146)  mmol/L


 


Potassium   4.4  (3.5-5.1)  mmol/L


 


Chloride   108  ()  mmol/L


 


Carbon Dioxide   20 L  (21-31)  mmol/L


 


BUN   22  (6.0-23.0)  mg/dL


 


Creatinine   1.1  (0.6-1.5)  mg/dL


 


Est Cr Clr Drug Dosing   87.47  mL/min


 


Estimated GFR (MDRD)   > 60.0  ml/min


 


Glucose   163 H  ()  mg/dL


 


Calcium   9.6  (8.8-10.8)  mg/dL











Med Orders - Current: 


 Current Medications





Acetaminophen (Tylenol)  650 mg PO Q4H PRN


   PRN Reason: Pain (Mild 1-3)/fever


   Last Admin: 12/27/17 06:54 Dose:  650 mg


Albuterol (Ventolin Hfa)  0 gm INH Q4H PRN


   PRN Reason: cough, sob


   Last Admin: 12/27/17 12:40 Dose:  2 puff


Albuterol/Ipratropium (Duoneb 3.0-0.5 Mg/3 Ml)  3 ml NEB Q4HRRT [facility]


   Last Admin: 12/28/17 09:47 Dose:  3 ml


Benzonatate (Tessalon Perles)  100 mg PO TID PRN


   PRN Reason: Cough


   Last Admin: 12/28/17 08:14 Dose:  100 mg


Enoxaparin Sodium (Lovenox)  40 mg SUBCUT DAILY@2000 [facility]


   Last Admin: 12/27/17 20:39 Dose:  40 mg


HCTZ/Losartan Potassium (Hyzaar 50-12.5 Mg)  1 tab PO BEDTIME [facility]


   Last Admin: 12/27/17 20:39 Dose:  1 tab


Ibuprofen (Motrin)  800 mg PO Q6H PRN


   PRN Reason: sob


   Last Admin: 12/28/17 08:27 Dose:  800 mg


Methylprednisolone Sodium Succinate (Solu-Medrol)  125 mg IVPUSH Q6H [facility]


   Last Admin: 12/28/17 08:14 Dose:  125 mg


Ondansetron HCl (Zofran Odt)  4 mg PO Q4H PRN


   PRN Reason: nausea, able to take PO


Oseltamivir Phosphate (Tamiflu)  75 mg PO BID [facility]


   Last Admin: 12/28/17 08:14 Dose:  75 mg





Discontinued Medications





Acetaminophen (Tylenol)  650 mg PO NOW ONE


   Stop: 12/26/17 20:17


   Last Admin: 12/26/17 20:41 Dose:  Not Given


Albuterol (Proventil Hfa)  0 gm INH Q4H PRN


   PRN Reason: cough, sob


Albuterol/Ipratropium (Duoneb 3.0-0.5 Mg/3 Ml)  3 ml NEB ONETIME ONE


   Stop: 12/26/17 19:16


   Last Admin: 12/26/17 19:20 Dose:  3 ml


Albuterol/Ipratropium (Duoneb 3.0-0.5 Mg/3 Ml)  3 ml NEB Q6HRRT [facility]


   Last Admin: 12/27/17 06:48 Dose:  3 ml


Sodium Chloride (Normal Saline)  1,000 mls @ 999 mls/hr IV STAT ONE


   Stop: 12/26/17 21:19


   Last Admin: 12/26/17 20:39 Dose:  999 mls/hr


Ketorolac Tromethamine (Toradol)  30 mg IVPUSH ONETIME ONE


   Stop: 12/27/17 09:46


   Last Admin: 12/27/17 10:37 Dose:  30 mg


Methylprednisolone Sodium Succinate (Solu-Medrol)  125 mg IVPUSH ONETIME ONE


   Stop: 12/26/17 20:10


   Last Admin: 12/26/17 20:39 Dose:  125 mg


Methylprednisolone Sodium Succinate (Solu-Medrol)  125 mg IVPUSH BID [facility]


   Last Admin: 12/27/17 09:21 Dose:  125 mg


Oseltamivir Phosphate (Tamiflu)  75 mg PO DAILY [facility]


   Last Admin: 12/27/17 09:21 Dose:  75 mg











- Exam


General: Reports: Alert, Oriented


HEENT: Reports: Pupils Equal, Pupils Reactive


Neck: Reports: Supple, No JVD


Lungs: Reports: Normal Respiratory Effort, Decreased Breath Sounds, Wheezing


Cardiovascular: Reports: Regular Rate, Regular Rhythm


GI/Abdominal Exam: Normal Bowel Sounds, Soft, Non-Tender


Extremities: Normal Inspection, No Pedal Edema, Normal Capillary Refill


Neurological: Reports: No New Focal Deficit





*Q Meaningful Use (DIS)





- VTE *Q


VTE Criteria *Q: 








- Stroke *Q


Stroke Criteria *Q: 








- AMI *Q


AMI Criteria *Q:

## 2018-02-10 ENCOUNTER — HOSPITAL ENCOUNTER (OUTPATIENT)
Dept: HOSPITAL 56 - MW.ED | Age: 48
Setting detail: OBSERVATION
LOS: 1 days | Discharge: HOME | End: 2018-02-11
Attending: FAMILY MEDICINE | Admitting: FAMILY MEDICINE
Payer: COMMERCIAL

## 2018-02-10 DIAGNOSIS — R09.02: ICD-10-CM

## 2018-02-10 DIAGNOSIS — J45.41: Primary | ICD-10-CM

## 2018-02-10 DIAGNOSIS — Z79.51: ICD-10-CM

## 2018-02-10 DIAGNOSIS — E66.9: ICD-10-CM

## 2018-02-10 DIAGNOSIS — I10: ICD-10-CM

## 2018-02-10 DIAGNOSIS — F17.210: ICD-10-CM

## 2018-02-10 LAB
CHLORIDE SERPL-SCNC: 108 MMOL/L (ref 98–110)
SODIUM SERPL-SCNC: 141 MMOL/L (ref 136–146)

## 2018-02-10 PROCEDURE — 80048 BASIC METABOLIC PNL TOTAL CA: CPT

## 2018-02-10 PROCEDURE — 85025 COMPLETE CBC W/AUTO DIFF WBC: CPT

## 2018-02-10 PROCEDURE — G0378 HOSPITAL OBSERVATION PER HR: HCPCS

## 2018-02-10 PROCEDURE — 36415 COLL VENOUS BLD VENIPUNCTURE: CPT

## 2018-02-10 PROCEDURE — 71046 X-RAY EXAM CHEST 2 VIEWS: CPT

## 2018-02-10 PROCEDURE — 93005 ELECTROCARDIOGRAM TRACING: CPT

## 2018-02-10 PROCEDURE — 99285 EMERGENCY DEPT VISIT HI MDM: CPT

## 2018-02-10 PROCEDURE — 80053 COMPREHEN METABOLIC PANEL: CPT

## 2018-02-10 PROCEDURE — 96361 HYDRATE IV INFUSION ADD-ON: CPT

## 2018-02-10 PROCEDURE — 83605 ASSAY OF LACTIC ACID: CPT

## 2018-02-10 PROCEDURE — 96375 TX/PRO/DX INJ NEW DRUG ADDON: CPT

## 2018-02-10 PROCEDURE — 96374 THER/PROPH/DIAG INJ IV PUSH: CPT

## 2018-02-10 PROCEDURE — 87804 INFLUENZA ASSAY W/OPTIC: CPT

## 2018-02-10 NOTE — PCM.HP
H&P History of Present Illness





- General


Date of Service: 02/10/18


Admit Problem/Dx: 


 Admission Diagnosis/Problem





Admission Diagnosis/Problem      Acute asthma








Source of Information: Patient


History Limitations: Reports: No Limitations





- History of Present Illness


Initial Comments - Free Text/Narative: 


47-year-old male presenting to the emergency department with chief complaint of 

one week of shortness of breath with past medical history of moderate 

persistent asthma and hypertension.





Patient stated that approximately a week ago he started feeling like he was 

starting to have an asthma attack. He did go to an urgent care center on Monday 

of this week where he was given prednisone 20 mg for 5 days. He states that he 

did take those medications and had been using his nebulizer at home. He did 

start feeling better with the steriods but once finished began to have symptoms 

again so presented to the emergency department for further evaluation. Patient 

is a nonsmoker and denies any fevers, chills, nausea, vomiting, diarrhea, chest 

pain, palpitations, syncopal events, or new focal neurologic deficits. Patient 

does feel that working outside in the cold does aggravate his asthma. He does 

take a daily medication as well as his rescue inhaler. Patient was recently 

hospitalized here in December for influenza and asthma exacerbation. He did 

require 2 days of steroids and DuoNeb nebs for him to feel better.





In the emergency department mild leukocytosis of 11.4 most likely secondary to 

steroids as patient afebrile and reporting no symptoms. Lactate and CMP were 

unremarkable. Influenza screen was negative. He was given duo nebs and 125 mg 

IV Solu-Medrol with improvement.





Patient was admitted for hypoxia/asthma exacerbation.








- Related Data


Allergies/Adverse Reactions: 


 Allergies











Allergy/AdvReac Type Severity Reaction Status Date / Time


 


No Known Allergies Allergy   Verified 02/10/18 21:18











Home Medications: 


 Home Meds





Albuterol [Ventolin HFA] 2 inh INH Q4HR PRN 04/03/17 [History]


Albuterol/Ipratropium [DuoNeb 3.0-0.5 MG/3 ML] 1 ampule PO ASDIRECTED 04/03/17 [

History]


Fluticasone/Vilanterol [Breo Ellipta 200-25 Mcg INH] 1 each IH DAILY 12/27/17 [

History]


Losartan/Hydrochlorothiazide [Losartan-HCTZ 50-12.5 MG] 1 each PO BEDTIME 12/27/ 17 [History]











Past Medical History


HEENT History: Reports: Impaired Vision


Other HEENT History: Patient reports farsightedness


Cardiovascular History: Reports: Hypertension


Respiratory History: Reports: Asthma


Gastrointestinal History: Reports: None


Genitourinary History: Reports: None, Retention, Urinary


Musculoskeletal History: Reports: None, Gout


Neurological History: Reports: None


Psychiatric History: Reports: None


Endocrine/Metabolic History: Reports: None, Obesity/BMI 30+


Hematologic History: Reports: None


Immunologic History: Reports: None


Oncologic (Cancer) History: Reports: None


Dermatologic History: Reports: None





- Infectious Disease History


Infectious Disease History: Reports: Chicken Pox





- Past Surgical History


HEENT Surgical History: Reports: Naso-Sinus Surgery, Polypectomy


Musculoskeletal Surgical History: Reports: None, Other (See Below)





Social & Family History





- Family History


Family Medical History: Noncontributory


Cardiac: Reports: Hypertension, MI


Respiratory: Reports: Asthma, Other (See Below)


Other Respiratory Family Hisory: Mother had lung CA. She was a heavy smoker.


Oncologic: Reports: Lung





- Tobacco Use


Smoking Status *Q: Current Every Day Smoker


Years of Tobacco use: 20


Packs/Tins Daily: 1


Second Hand Smoke Exposure: No





- Caffeine Use


Caffeine Use: Reports: Tea


Caffeine Use Comment: Occasional coffee drinker





- Alcohol Use


Days Per Week of Alcohol Use: 0


Number of Drinks Per Day: 0


Total Drinks Per Week: 0





- Recreational Drug Use


Recreational Drug Use: No





- Living Situation & Occupation


Living situation: Reports: 


Occupation: Employed





H&P Review of Systems





- Review of Systems:


Review Of Systems: See Below


General: Denies: Fever, Chills, Weakness


HEENT: Denies: Contact Lenses, Vertigo


Pulmonary: Reports: Shortness of Breath, Wheezing.  Denies: Pleuritic Chest Pain

, Hemoptysis


Cardiovascular: Denies: Chest Pain, Palpitations, Lightheadedness


Gastrointestinal: Denies: Abdominal Pain, Anorexia, Diarrhea, Nausea, Vomiting


Genitourinary: Denies: Dysuria, Hematuria


Musculoskeletal: Denies: Neck Pain


Skin: Denies: Cyanosis


Psychiatric: Denies: Confusion, Depression


Neurological: Denies: Confusion, Dizziness, Headache


Hematologic/Lymphatic: Denies: Anemia





Exam





- Exam


Exam: See Below





- Vital Signs


Vital Signs: 


 Last Vital Signs











Temp  97.6 F   02/10/18 21:18


 


Pulse  113 H  02/10/18 22:07


 


Resp  26 H  02/10/18 22:07


 


BP  198/111 H  02/10/18 21:18


 


Pulse Ox  94 L  02/10/18 22:07











Weight: 117.027 kg





- Exam


Quality Assessment: Supplemental Oxygen


General: Alert, Oriented, Cooperative


HEENT: Conjunctiva Clear, EACs Clear, EOMI, Hearing Intact, Mucosa Moist & Pink

, Nares Patent, PERRLA


Neck: Supple, Trachea Midline.  No: JVD


Lungs: Normal Respiratory Effort, Decreased Breath Sounds, Wheezing


Cardiovascular: Regular Rate, Regular Rhythm, Normal S1, Normal S2


GI/Abdominal Exam: Normal Bowel Sounds, Soft, Non-Tender, No Organomegaly, No 

Distention, No Abnormal Bruit


 (Male) Exam: Deferred


Rectal (Males) Exam: Deferred


Back Exam: Normal Inspection, Full Range of Motion, NT


Extremities: Normal Inspection, Normal Range of Motion, Non-Tender, No Pedal 

Edema, Normal Capillary Refill


Peripheral Pulses: 2+: Radial (L), Radial (R), Posterior Tibial (L), Posterior 

Tibial (R), Dorsalis Pedis (L), Dorsalis Pedis (R)


Skin: Warm, Dry, Intact


Neurological: Cranial Nerves Intact


Neuro Extensive - Mental Status: Alert, Oriented x3, Normal Mood/Affect, Normal 

Cognition


Neuro Extensive - Motor, Sensory, Reflexes: CN II-XII Intact


Psychiatric: Alert, Normal Affect, Normal Mood





- Patient Data


Lab Results Last 24 hrs: 


 Laboratory Results - last 24 hr











  02/10/18 02/10/18 02/10/18 Range/Units





  21:20 21:20 21:20 


 


WBC  11.42 H    (4.0-11.0)  K/uL


 


RBC  4.87    (4.50-5.90)  M/uL


 


Hgb  15.1    (13.0-17.0)  g/dL


 


Hct  43.2    (38.0-50.0)  %


 


MCV  88.7    (80.0-98.0)  fL


 


MCH  31.0    (27.0-32.0)  pg


 


MCHC  35.0    (31.0-37.0)  g/dL


 


RDW Std Deviation  48.4    (28.0-62.0)  fl


 


RDW Coeff of Walter  15    (11.0-15.0)  %


 


Plt Count  293    (150-400)  K/uL


 


MPV  9.30    (7.40-12.00)  fL


 


Neut % (Auto)  58.1    (48.0-80.0)  %


 


Lymph % (Auto)  16.1    (16.0-40.0)  %


 


Mono % (Auto)  6.8    (0.0-15.0)  %


 


Eos % (Auto)  18.1 H    (0.0-7.0)  %


 


Baso % (Auto)  0.9    (0.0-1.5)  %


 


Neut # (Auto)  6.6 H    (1.4-5.7)  K/uL


 


Lymph # (Auto)  1.8    (0.6-2.4)  K/uL


 


Mono # (Auto)  0.8    (0.0-0.8)  K/uL


 


Eos # (Auto)  2.1 H    (0.0-0.7)  K/uL


 


Baso # (Auto)  0.1    (0.0-0.1)  K/uL


 


Nucleated RBC %  0.0    /100WBC


 


Nucleated RBCs #  0    K/uL


 


Lactate    1.0  (0.20-2.00)  mmol/L


 


Sodium   141   (136-146)  mmol/L


 


Potassium   3.4 L   (3.5-5.1)  mmol/L


 


Chloride   108   ()  mmol/L


 


Carbon Dioxide   22   (21-31)  mmol/L


 


BUN   16   (6.0-23.0)  mg/dL


 


Creatinine   1.1   (0.6-1.5)  mg/dL


 


Est Cr Clr Drug Dosing   87.47   mL/min


 


Estimated GFR (MDRD)   > 60.0   ml/min


 


Glucose   90   ()  mg/dL


 


Calcium   9.7   (8.8-10.8)  mg/dL


 


Total Bilirubin   1.7 H   (0.1-1.5)  mg/dL


 


AST   35   (5-40)  IU/L


 


ALT   54   (8-54)  IU/L


 


Alkaline Phosphatase   99   ()  


 


Total Protein   7.3   (6.0-8.0)  g/dL


 


Albumin   4.7   (3.5-5.0)  g/dL


 


Globulin   2.6   (2.0-3.5)  g/dL


 


Albumin/Globulin Ratio   1.8   (1.3-2.8)  











Result Diagrams: 


 02/11/18 05:05





 02/11/18 05:05


Tenzin Results Last 24 hrs: 


 Microbiology











 02/10/18 21:29 Influenza Type A Antigen Screen - Final





 Nasopharyngeal Swab    NEGATIVE INFLUENZA A VIRUS AG





 Influenza Type B Antigen Screen - Final





    NEGATIVE INFLUENZA B VIRUS AG














*Q Meaningful Use (ADM)





- VTE *Q


VTE Criteria *Q: 








- Stroke *Q


Stroke Criteria *Q: 








- AMI *Q


AMI Criteria *Q: 








- Problem List


(1) Acute asthma exacerbation


SNOMED Code(s): 470486857


   ICD Code: J45.901 - UNSPECIFIED ASTHMA WITH (ACUTE) EXACERBATION   Status: 

Acute   Priority: High   Current Visit: Yes   


Qualifiers: 


   Asthma severity: moderate   Asthma persistence: unspecified   Qualified Code(

s): J45.901 - Unspecified asthma with (acute) exacerbation   





(2) Hypoxia


SNOMED Code(s): 412087495


   ICD Code: R09.02 - HYPOXEMIA   Status: Acute   Priority: High   Current Visit

: Yes   





(3) HTN (hypertension)


SNOMED Code(s): 80071239


   Status: Chronic   Priority: Low   Current Visit: Yes   


Qualifiers: 


   Hypertension type: essential hypertension   Qualified Code(s): I10 - 

Essential (primary) hypertension   





(4) Moderate persistent asthma


SNOMED Code(s): 802568504


   ICD Code: J45.40 - MODERATE PERSISTENT ASTHMA, UNCOMPLICATED   Status: 

Chronic   Priority: High   Current Visit: Yes   


Qualifiers: 


   Asthma complication type: uncomplicated   Qualified Code(s): J45.40 - 

Moderate persistent asthma, uncomplicated   


Problem List Initiated/Reviewed/Updated: Yes


Orders Last 24hrs: 


 Active Orders 24 hr











 Category Date Time Status


 


 Patient Status [ADT] Stat ADT  02/10/18 22:11 Active


 


 Cardiac Monitoring [RC] .AS DIRECTED Care  02/10/18 21:13 Active


 


 EKG Documentation Completion [RC] STAT Care  02/10/18 21:13 Active


 


 Oxygen Therapy, ED [RC] ASDIRECTED Care  02/10/18 21:13 Active


 


 Pulse Oximetry [RC] ASDIRECTED Care  02/10/18 21:13 Active


 


 RT Aerosol Therapy [RC] ASDIRECTED Care  02/10/18 21:15 Active


 


 RT Aerosol Therapy [RC] ASDIRECTED Care  02/10/18 21:51 Active


 


 Chest 2V [CR] Stat Exams  02/10/18 21:14 Taken


 


 Sodium Chloride 0.9% [Saline Flush] Med  02/10/18 21:14 Active





 10 ml FLUSH ASDIRECTED PRN   


 


 Sodium Chloride 0.9% [Saline Flush] Med  02/10/18 21:14 Active





 2.5 ml FLUSH ASDIRECTED PRN   


 


 Saline Lock Insert [OM.PC] Stat Oth  02/10/18 21:13 Ordered








 Medication Orders





Sodium Chloride (Saline Flush)  10 ml FLUSH ASDIRECTED PRN


   PRN Reason: Keep Vein Open


Sodium Chloride (Saline Flush)  2.5 ml FLUSH ASDIRECTED PRN


   PRN Reason: Keep Vein Open








Assessment/Plan Comment:: 


47-year-old male admitted 2/10/184 hypoxia secondary to acute asthma 

exacerbation with past medical history of moderate persistent asthma and 

hypertension.





Hypoxia/acute asthma exacerbation: We'll treat with duo nebs and IV Solu-

Medrol. Patient did require hospitalization for extended amount of time when he 

was diagnosed with influenza in December. We will watch him closely and when 

his respiratory status improves we will discharge him with a long dose taper of 

steroids.





Hypertension: Currently controlled will restart home medications.

## 2018-02-10 NOTE — EDM.PDOC
ED HPI GENERAL MEDICAL PROBLEM





- General


Stated Complaint: UNKNOWN


Time Seen by Provider: 02/10/18 21:13





- History of Present Illness


INITIAL COMMENTS - FREE TEXT/NARRATIVE: 





HISTORY AND PHYSICAL:





History of present illness:


The patient is a 47-year-old male with a known history of asthma and 

hypertension who presents with a recurrent asthma attack. Patient says that 

about a week ago he started feeling like an asthma attack was going to happen 

and he was seen at urgent care on Monday. At that time he was put on prednisone 

20 mg once a day for 5 days and he has been using his nebulizer machine at 

home. He said he started to feel better but then today started feeling worse 

again. The patient does not smoke and he has had no fevers chills nausea 

vomiting or diarrhea and has no chest pain. He's been coughing a lot which has 

been dry and hacking with only a scant amount of phlegm. The patient said he 

has been using his nebulizer treatments every 2 hours. Patient does work 

outside in the cold and feels that that might have aggravated things. Patient 

says that he felt like he was not making any progress with his treatments. He 

said that he finished the prednisone today and things seem to worsen.


Patient tells nursing he did have a positive influenza test the beginning of 

January.


Review of systems: 


As per history of present illness and below otherwise all systems reviewed and 

negative.





Past medical history: 


As per history of present illness and as reviewed below otherwise 

noncontributory.





Surgical history: 


As per history of present illness and as reviewed below otherwise 

noncontributory.





Social history: 


No reported history of drug or alcohol abuse.





Family history: 


As per history of present illness and as reviewed below otherwise 

noncontributory.





Physical exam:


General: Well-developed well-nourished overweight male who seems somewhat 

breathless in the room and with activity and vital signs are noted by me 

including an O2 sat on room air of 89%.


HEENT: Atraumatic, normocephalic,  negative for conjunctival pallor or scleral 

icterus, mucous membranes moist, throat clear, neck supple, nontender, trachea 

midline.


Lungs: Severely diminished breath sounds throughout all lung fields with 

expiratory wheezing and rhonchi, breath sounds equal bilaterally, chest 

nontender.


Heart: S1S2, regular rhythm slightly tachycardic rate on my evaluation


Abdomen: Soft, nondistended, nontender. NABS.


Pelvis: Stable nontender.


Genitourinary: Deferred.


Rectal: Deferred.


Extremities: Atraumatic, negative for cords or calf pain. Neurovascular 

unremarkable. No pedal edema or leg asymmetry


Neuro: Awake, alert, oriented. Cranial nerves II through XII unremarkable. 

Cerebellum unremarkable. Motor and sensory unremarkable throughout. Exam 

nonfocal.


Skin: No diaphoresis and turgor is normal


Diagnostics:


Chest x-ray CBC CMP lactic acid influenza swab EKG





Therapeutics:


IV O2 monitor IV fluids duo neb Solu-Medrol Toradol





After the first duo neb the patient is requiring 8 L simple mask oxygen to keep 

the sat at 95% and he is still having some work of breathing and wheezing. 

Respiratory therapy is currently at bedside giving another DuoNeb and 

evaluating for BiPAP. The patient says he is supposed to wear C Pap at sleep 

times and he does not have the machine with him. We will continue to monitor 

his responses and proceed to BiPAP as indicated.





After the second duo neb the patient is improving and we are weaning his oxygen 

but he is aware of all testing results and my recommendation for observation 

admission and he agrees.





Impression: 


Acute asthma exacerbation with hypoxia





Definitive disposition and diagnosis as appropriate pending reevaluation and 

review of above.





- Related Data


 Allergies











Allergy/AdvReac Type Severity Reaction Status Date / Time


 


No Known Allergies Allergy   Verified 02/10/18 21:18











Home Meds: 


 Home Meds





Albuterol [Ventolin HFA] 2 inh INH Q4HR PRN 04/03/17 [History]


Albuterol/Ipratropium [DuoNeb 3.0-0.5 MG/3 ML] 1 ampule PO ASDIRECTED 04/03/17 [

History]


Celecoxib 200 mg PO DAILY PRN 12/27/17 [History]


Colchicine [Colcrys] 0.6 mg PO ASDIRECTED PRN 12/27/17 [History]


Fluticasone/Vilanterol [Breo Ellipta 200-25 Mcg INH] 1 each IH DAILY 12/27/17 [

History]


Indomethacin 50 mg PO TID PRN 12/27/17 [History]


Losartan/Hydrochlorothiazide [Losartan-HCTZ 50-12.5 MG] 1 each PO BEDTIME 12/27/ 17 [History]


Mometasone/Formoterol [Dulera 200-5 MCG] 2 puff IH BID 12/27/17 [History]


Umeclidinium Bromide [Incruse Ellipta*] 62.5 mcg IH DAILY 12/27/17 [History]


Benzonatate [Tessalon Perles] 100 mg PO TID PRN #30 cap 12/28/17 [Rx]


Oseltamivir Phosphate [IJD: Tamiflu] 75 mg PO BID #9 capsule 12/28/17 [Rx]


Prednisone [IJD: predniSONE] 40 mg PO WITHBREAKFAST #10 tab 12/28/17 [Rx]











Past Medical History


HEENT History: Reports: Impaired Vision


Other HEENT History: Patient reports farsightedness


Cardiovascular History: Reports: Hypertension


Respiratory History: Reports: Asthma


Gastrointestinal History: Reports: None


Genitourinary History: Reports: None, Retention, Urinary


Musculoskeletal History: Reports: None, Gout


Neurological History: Reports: None


Psychiatric History: Reports: None


Endocrine/Metabolic History: Reports: None, Obesity/BMI 30+


Hematologic History: Reports: None


Immunologic History: Reports: None


Oncologic (Cancer) History: Reports: None


Dermatologic History: Reports: None





- Infectious Disease History


Infectious Disease History: Reports: Chicken Pox





- Past Surgical History


HEENT Surgical History: Reports: Naso-Sinus Surgery, Polypectomy


Musculoskeletal Surgical History: Reports: None, Other (See Below)





Social & Family History





- Family History


Family Medical History: Noncontributory


Cardiac: Reports: Hypertension, MI


Respiratory: Reports: Asthma, Other (See Below)


Other Respiratory Family Hisory: Mother had lung CA. She was a heavy smoker.


Oncologic: Reports: Lung





- Tobacco Use


Smoking Status *Q: Current Every Day Smoker


Years of Tobacco use: 20


Packs/Tins Daily: 1


Second Hand Smoke Exposure: No





- Caffeine Use


Caffeine Use: Reports: Tea


Caffeine Use Comment: Occasional coffee drinker





- Alcohol Use


Days Per Week of Alcohol Use: 0


Number of Drinks Per Day: 0


Total Drinks Per Week: 0





- Recreational Drug Use


Recreational Drug Use: No





- Living Situation & Occupation


Living situation: Reports: 


Occupation: Employed





ED ROS GENERAL





- Review of Systems


Review Of Systems: ROS reveals no pertinent complaints other than HPI.





ED EXAM, GENERAL





- Physical Exam


Exam: See Below (See dictation)





Course





- Vital Signs


Last Recorded V/S: 


 Last Vital Signs











Temp  36.4 C   02/10/18 21:18


 


Pulse  121 H  02/10/18 21:18


 


Resp  22 H  02/10/18 21:18


 


BP  198/111 H  02/10/18 21:18


 


Pulse Ox  89 L  02/10/18 21:18














- Orders/Labs/Meds


Orders: 


 Active Orders 24 hr











 Category Date Time Status


 


 Cardiac Monitoring [RC] .AS DIRECTED Care  02/10/18 21:13 Active


 


 EKG Documentation Completion [RC] STAT Care  02/10/18 21:13 Active


 


 Oxygen Therapy, ED [RC] ASDIRECTED Care  02/10/18 21:13 Active


 


 Pulse Oximetry [RC] ASDIRECTED Care  02/10/18 21:13 Active


 


 RT Aerosol Therapy [RC] ASDIRECTED Care  02/10/18 21:15 Active


 


 RT Aerosol Therapy [RC] ASDIRECTED Care  02/10/18 21:51 Active


 


 Chest 2V [CR] Stat Exams  02/10/18 21:14 Taken


 


 Sodium Chloride 0.9% [Normal Saline] 1,000 ml Med  02/10/18 21:14 Active





 IV STAT   


 


 Sodium Chloride 0.9% [Saline Flush] Med  02/10/18 21:14 Active





 10 ml FLUSH ASDIRECTED PRN   


 


 Sodium Chloride 0.9% [Saline Flush] Med  02/10/18 21:14 Active





 2.5 ml FLUSH ASDIRECTED PRN   


 


 Saline Lock Insert [OM.PC] Stat Oth  02/10/18 21:13 Ordered








 Medication Orders





Sodium Chloride (Normal Saline)  1,000 mls @ 999 mls/hr IV STAT ONE


   Stop: 02/10/18 22:14


   Last Admin: 02/10/18 21:27  Dose: 999 mls/hr


Sodium Chloride (Saline Flush)  10 ml FLUSH ASDIRECTED PRN


   PRN Reason: Keep Vein Open


Sodium Chloride (Saline Flush)  2.5 ml FLUSH ASDIRECTED PRN


   PRN Reason: Keep Vein Open








Labs: 


 Laboratory Tests











  02/10/18 02/10/18 02/10/18 Range/Units





  21:20 21:20 21:20 


 


WBC  11.42 H    (4.0-11.0)  K/uL


 


RBC  4.87    (4.50-5.90)  M/uL


 


Hgb  15.1    (13.0-17.0)  g/dL


 


Hct  43.2    (38.0-50.0)  %


 


MCV  88.7    (80.0-98.0)  fL


 


MCH  31.0    (27.0-32.0)  pg


 


MCHC  35.0    (31.0-37.0)  g/dL


 


RDW Std Deviation  48.4    (28.0-62.0)  fl


 


RDW Coeff of Walter  15    (11.0-15.0)  %


 


Plt Count  293    (150-400)  K/uL


 


MPV  9.30    (7.40-12.00)  fL


 


Neut % (Auto)  58.1    (48.0-80.0)  %


 


Lymph % (Auto)  16.1    (16.0-40.0)  %


 


Mono % (Auto)  6.8    (0.0-15.0)  %


 


Eos % (Auto)  18.1 H    (0.0-7.0)  %


 


Baso % (Auto)  0.9    (0.0-1.5)  %


 


Neut # (Auto)  6.6 H    (1.4-5.7)  K/uL


 


Lymph # (Auto)  1.8    (0.6-2.4)  K/uL


 


Mono # (Auto)  0.8    (0.0-0.8)  K/uL


 


Eos # (Auto)  2.1 H    (0.0-0.7)  K/uL


 


Baso # (Auto)  0.1    (0.0-0.1)  K/uL


 


Nucleated RBC %  0.0    /100WBC


 


Nucleated RBCs #  0    K/uL


 


Lactate    1.0  (0.20-2.00)  mmol/L


 


Sodium   141   (136-146)  mmol/L


 


Potassium   3.4 L   (3.5-5.1)  mmol/L


 


Chloride   108   ()  mmol/L


 


Carbon Dioxide   22   (21-31)  mmol/L


 


BUN   16   (6.0-23.0)  mg/dL


 


Creatinine   1.1   (0.6-1.5)  mg/dL


 


Est Cr Clr Drug Dosing   87.47   mL/min


 


Estimated GFR (MDRD)   > 60.0   ml/min


 


Glucose   90   ()  mg/dL


 


Calcium   9.7   (8.8-10.8)  mg/dL


 


Total Bilirubin   1.7 H   (0.1-1.5)  mg/dL


 


AST   35   (5-40)  IU/L


 


ALT   54   (8-54)  IU/L


 


Alkaline Phosphatase   99   ()  


 


Total Protein   7.3   (6.0-8.0)  g/dL


 


Albumin   4.7   (3.5-5.0)  g/dL


 


Globulin   2.6   (2.0-3.5)  g/dL


 


Albumin/Globulin Ratio   1.8   (1.3-2.8)  











Meds: 


Medications











Generic Name Dose Route Start Last Admin





  Trade Name Freq  PRN Reason Stop Dose Admin


 


Sodium Chloride  1,000 mls @ 999 mls/hr  02/10/18 21:14  02/10/18 21:27





  Normal Saline  IV  02/10/18 22:14  999 mls/hr





  STAT ONE   Administration


 


Sodium Chloride  10 ml  02/10/18 21:14  





  Saline Flush  FLUSH   





  ASDIRECTED PRN   





  Keep Vein Open   


 


Sodium Chloride  2.5 ml  02/10/18 21:14  





  Saline Flush  FLUSH   





  ASDIRECTED PRN   





  Keep Vein Open   














Discontinued Medications














Generic Name Dose Route Start Last Admin





  Trade Name Freq  PRN Reason Stop Dose Admin


 


Albuterol/Ipratropium  3 ml  02/10/18 21:14  02/10/18 21:27





  Duoneb 3.0-0.5 Mg/3 Ml  NEB  02/10/18 21:15  3 ml





  ONETIME ONE   Administration


 


Albuterol/Ipratropium  Confirm  02/10/18 21:14  02/10/18 21:27





  Duoneb 3.0-0.5 Mg/3 Ml  Administered  02/10/18 21:15  Not Given





  Dose   





  3 ml   





  .ROUTE   





  .STK-MED ONE   


 


Albuterol/Ipratropium  3 ml  02/10/18 21:51  02/10/18 21:56





  Duoneb 3.0-0.5 Mg/3 Ml  NEB  02/10/18 21:52  3 ml





  ONETIME ONE   Administration


 


Ketorolac Tromethamine  30 mg  02/10/18 21:57  02/10/18 22:02





  Toradol  IVPUSH  02/10/18 21:58  30 mg





  ONETIME ONE   Administration


 


Methylprednisolone Sodium Succinate  125 mg  02/10/18 21:14  02/10/18 21:27





  Solu-Medrol  IVPUSH  02/10/18 21:15  125 mg





  ONETIME ONE   Administration














Departure





- Departure


Time of Disposition: 22:09


Disposition: Refer to Observation


Condition: Fair


Clinical Impression: 


 Hypoxia





Acute asthma exacerbation


Qualifiers:


 Asthma severity: moderate Asthma persistence: unspecified Qualified Code(s): 

J45.901 - Unspecified asthma with (acute) exacerbation








- Discharge Information


Referrals: 


PCP,None [Primary Care Provider] - 





- My Orders


Last 24 Hours: 


My Active Orders





02/10/18 21:13


Cardiac Monitoring [RC] .AS DIRECTED 


EKG Documentation Completion [RC] STAT 


Oxygen Therapy, ED [RC] ASDIRECTED 


Pulse Oximetry [RC] ASDIRECTED 


Saline Lock Insert [OM.PC] Stat 





02/10/18 21:14


Chest 2V [CR] Stat 


Sodium Chloride 0.9% [Normal Saline] 1,000 ml IV STAT 


Sodium Chloride 0.9% [Saline Flush]   10 ml FLUSH ASDIRECTED PRN 


Sodium Chloride 0.9% [Saline Flush]   2.5 ml FLUSH ASDIRECTED PRN 





02/10/18 21:15


RT Aerosol Therapy [RC] ASDIRECTED 





02/10/18 21:51


RT Aerosol Therapy [RC] ASDIRECTED 














- Assessment/Plan


Last 24 Hours: 


My Active Orders





02/10/18 21:13


Cardiac Monitoring [RC] .AS DIRECTED 


EKG Documentation Completion [RC] STAT 


Oxygen Therapy, ED [RC] ASDIRECTED 


Pulse Oximetry [RC] ASDIRECTED 


Saline Lock Insert [OM.PC] Stat 





02/10/18 21:14


Chest 2V [CR] Stat 


Sodium Chloride 0.9% [Normal Saline] 1,000 ml IV STAT 


Sodium Chloride 0.9% [Saline Flush]   10 ml FLUSH ASDIRECTED PRN 


Sodium Chloride 0.9% [Saline Flush]   2.5 ml FLUSH ASDIRECTED PRN 





02/10/18 21:15


RT Aerosol Therapy [RC] ASDIRECTED 





02/10/18 21:51


RT Aerosol Therapy [RC] ASDIRECTED

## 2018-02-11 VITALS — DIASTOLIC BLOOD PRESSURE: 95 MMHG | SYSTOLIC BLOOD PRESSURE: 153 MMHG

## 2018-02-11 LAB
CHLORIDE SERPL-SCNC: 108 MMOL/L (ref 98–110)
SODIUM SERPL-SCNC: 141 MMOL/L (ref 136–146)

## 2018-02-11 RX ADMIN — METHYLPREDNISOLONE SODIUM SUCCINATE SCH MG: 40 INJECTION, POWDER, FOR SOLUTION INTRAMUSCULAR; INTRAVENOUS at 08:44

## 2018-02-11 RX ADMIN — METHYLPREDNISOLONE SODIUM SUCCINATE SCH MG: 40 INJECTION, POWDER, FOR SOLUTION INTRAMUSCULAR; INTRAVENOUS at 02:01

## 2018-02-11 NOTE — PCM.DCSUM1
**Discharge Summary





- Hospital Course


HPI Initial Comments: 


47-year-old male admitted 2/10/184 hypoxia secondary to acute asthma 

exacerbation with past medical history of moderate persistent asthma and 

hypertension.





Brief History: Patient stated that approximately a week ago he started feeling 

like he was starting to have an asthma attack. He did go to an urgent care 

center on Monday of this week where he was given prednisone 20 mg for 5 days. 

He stated that he did take those medications and had been using his nebulizer 

at home. He did start feeling better with the steriods but once finished began 

to have symptoms again so presented to the emergency department for further 

evaluation. Patient is a nonsmoker and denies any fevers, chills, nausea, 

vomiting, diarrhea, chest pain, palpitations, syncopal events, or new focal 

neurologic deficits. Patient does feel that working outside in the cold does 

aggravate his asthma. He does take a daily medication as well as his rescue 

inhaler. Patient was recently hospitalized here in December for influenza and 

asthma exacerbation. He did require 2 days of steroids and DuoNeb nebs for him 

to feel better.





- Discharge Data


Discharge Date: 02/11/18


Discharge Disposition: Home, Self-Care 01


Condition: Good





- Discharge Diagnosis/Problem(s)


(1) Acute asthma exacerbation


SNOMED Code(s): 688718135


   ICD Code: J45.901 - UNSPECIFIED ASTHMA WITH (ACUTE) EXACERBATION   Status: 

Resolved   Priority: High   Current Visit: Yes   


Qualifiers: 


   Asthma severity: moderate   Asthma persistence: unspecified   Qualified Code(

s): J45.901 - Unspecified asthma with (acute) exacerbation   





(2) Hypoxia


SNOMED Code(s): 288499714


   ICD Code: R09.02 - HYPOXEMIA   Status: Resolved   Priority: High   Current 

Visit: Yes   





(3) HTN (hypertension)


SNOMED Code(s): 28076452


   Status: Chronic   Priority: Low   Current Visit: Yes   


Qualifiers: 


   Hypertension type: essential hypertension   Qualified Code(s): I10 - 

Essential (primary) hypertension   





(4) Moderate persistent asthma


SNOMED Code(s): 788745400


   ICD Code: J45.40 - MODERATE PERSISTENT ASTHMA, UNCOMPLICATED   Status: 

Chronic   Priority: High   Current Visit: Yes   


Qualifiers: 


   Asthma complication type: uncomplicated   Qualified Code(s): J45.40 - 

Moderate persistent asthma, uncomplicated   





- Patient Instructions


Diet: Heart Healthy Diet


Activity: Rest and Relax Today


Driving: Do Not Drive


Showering/Bathing: May Shower


Notify Provider of: Fever, Increased Pain, Nausea and/or Vomiting


Other/Special Instructions: Follow-up with primary care physician.  return to 

emergency department if new or worsening symptoms.





- Discharge Plan


Prescriptions/Med Rec: 


Prednisone [IJD: predniSONE] 20 mg PO WITHBREAKFAST #30 tab


Home Medications: 


 Home Meds





Albuterol [Ventolin HFA] 2 inh INH Q4HR PRN 04/03/17 [History]


Albuterol/Ipratropium [DuoNeb 3.0-0.5 MG/3 ML] 1 ampule PO ASDIRECTED 04/03/17 [

History]


Fluticasone/Vilanterol [Breo Ellipta 200-25 Mcg INH] 1 each IH DAILY 12/27/17 [

History]


Losartan/Hydrochlorothiazide [Losartan-HCTZ 50-12.5 MG] 1 each PO BEDTIME 12/27/ 17 [History]


Prednisone [IJD: predniSONE] 20 mg PO WITHBREAKFAST #30 tab 02/11/18 [Rx]








Forms:  ED Department Discharge


Referrals: 


PCP,None [Primary Care Provider] - 





- Discharge Summary/Plan Comment


DC Time >30 min.: No


Discharge Summary/Plan Comment: 


47-year-old male admitted 2/10/184 hypoxia secondary to acute asthma 

exacerbation with past medical history of moderate persistent asthma and 

hypertension.





Patient stated that approximately a week ago he started feeling like he was 

starting to have an asthma attack. He did go to an urgent care center on Monday 

of this week where he was given prednisone 20 mg for 5 days. He stated that he 

did take those medications and had been using his nebulizer at home. He did 

start feeling better with the steriods but once finished began to have symptoms 

again so presented to the emergency department for further evaluation. Patient 

is a nonsmoker and denies any fevers, chills, nausea, vomiting, diarrhea, chest 

pain, palpitations, syncopal events, or new focal neurologic deficits. Patient 

does feel that working outside in the cold does aggravate his asthma. He does 

take a daily medication as well as his rescue inhaler. Patient was recently 

hospitalized here in December for influenza and asthma exacerbation. He did 

require 2 days of steroids and DuoNeb nebs for him to feel better.





In the emergency department mild leukocytosis of 11.4 most likely secondary to 

steroids as patient afebrile and reporting no symptoms. Lactate and CMP were 

unremarkable. Influenza screen was negative. He was given duo nebs and 125 mg 

IV Solu-Medrol with improvement.





Patient was admitted for hypoxia/asthma exacerbation.





Patient did well throughout his stay. He was treated with IV Solu-Medrol as 

well as DuoNeb's. On second day of admission he was satting 93% on room air. He 

was discharged in good condition with a prescription for a long dose prednisone 

taper. He was also scheduled for a follow-up with primary care physician. He 

was instructed to return to the emergency department if he had any new or 

worsening symptoms. Patient states that he is planning on returning to [place] 

for work as he feels that the cold weather is exacerbating his asthma.








- General Info


Date of Service: 02/11/18


Admission Dx/Problem (Free Text: 


 Admission Diagnosis/Problem





Admission Diagnosis/Problem      Acute asthma








Subjective Update: 





Doing much better this morning. States that he has not been using supplemental 

oxygen all morning long. Is wishing to go home if he is able. Denies any pain 

or shortness of breath.


Functional Status: Reports: Pain Controlled, Tolerating Diet, Ambulating





- Review of Systems


General: Denies: Fever, Weakness, Fatigue


HEENT: Denies: Dysphasia, Headaches, Sinus Congestion


Pulmonary: Reports: Shortness of Breath.  Denies: Cough, Sputum


Cardiovascular: Denies: Chest Pain, Palpitations, Edema


Gastrointestinal: Denies: Abdominal Pain, Nausea, Vomiting


Genitourinary: Denies: Dysuria, Hematuria


Musculoskeletal: Denies: Neck Pain, Leg Pain


Skin: Denies: Cyanosis


Neurological: Denies: Confusion, Dizziness, Headache


Psychiatric: Denies: Confusion





- Patient Data


Vitals - Most Recent: 


 Last Vital Signs











Temp  97.8 F   02/11/18 04:00


 


Pulse  86   02/11/18 04:00


 


Resp  20   02/11/18 04:00


 


BP  151/89 H  02/11/18 04:00


 


Pulse Ox  95   02/11/18 04:00











Weight - Most Recent: 117.027 kg


I&O - Last 24 hours: 


 Intake & Output











 02/10/18 02/11/18 02/11/18





 22:59 06:59 14:59


 


Intake Total  900 


 


Output Total  950 


 


Balance  -50 











Lab Results - Last 24 hrs: 


 Laboratory Results - last 24 hr











  02/11/18 02/11/18 Range/Units





  05:05 05:05 


 


WBC  9.09   (4.0-11.0)  K/uL


 


RBC  4.76   (4.50-5.90)  M/uL


 


Hgb  14.5   (13.0-17.0)  g/dL


 


Hct  42.3   (38.0-50.0)  %


 


MCV  88.9   (80.0-98.0)  fL


 


MCH  30.5   (27.0-32.0)  pg


 


MCHC  34.3   (31.0-37.0)  g/dL


 


RDW Std Deviation  48.5   (28.0-62.0)  fl


 


RDW Coeff of Walter  15   (11.0-15.0)  %


 


Plt Count  264   (150-400)  K/uL


 


MPV  9.40   (7.40-12.00)  fL


 


Neut % (Auto)  93.1 H   (48.0-80.0)  %


 


Lymph % (Auto)  5.9 L   (16.0-40.0)  %


 


Mono % (Auto)  0.6   (0.0-15.0)  %


 


Eos % (Auto)  0.2   (0.0-7.0)  %


 


Baso % (Auto)  0.2   (0.0-1.5)  %


 


Neut # (Auto)  8.5 H   (1.4-5.7)  K/uL


 


Lymph # (Auto)  0.5 L   (0.6-2.4)  K/uL


 


Mono # (Auto)  0.1   (0.0-0.8)  K/uL


 


Eos # (Auto)  0.0   (0.0-0.7)  K/uL


 


Baso # (Auto)  0.0   (0.0-0.1)  K/uL


 


Nucleated RBC %  0.0   /100WBC


 


Nucleated RBCs #  0   K/uL


 


Sodium   141  (136-146)  mmol/L


 


Potassium   4.0  (3.5-5.1)  mmol/L


 


Chloride   108  ()  mmol/L


 


Carbon Dioxide   22  (21-31)  mmol/L


 


BUN   15  (6.0-23.0)  mg/dL


 


Creatinine   1.0  (0.6-1.5)  mg/dL


 


Est Cr Clr Drug Dosing   95.82  mL/min


 


Estimated GFR (MDRD)   > 60.0  ml/min


 


Glucose   153 H  ()  mg/dL


 


Calcium   9.4  (8.8-10.8)  mg/dL











Med Orders - Current: 


 Current Medications





Acetaminophen (Tylenol)  650 mg PO Q6H PRN


   PRN Reason: Pain


Albuterol/Ipratropium (Duoneb 3.0-0.5 Mg/3 Ml)  3 ml NEB Q4HRRT PRN


   PRN Reason: Wheezing


   Last Admin: 02/11/18 01:55 Dose:  3 ml


Enoxaparin Sodium (Lovenox)  40 mg SUBCUT DAILY HENNA


Methylprednisolone Sodium Succinate (Solu-Medrol)  60 mg IVPUSH Q6H HENNA


   Last Admin: 02/11/18 02:01 Dose:  60 mg


Morphine Sulfate (Morphine)  2 mg IVPUSH Q2H PRN


   PRN Reason: Pain


Morphine Sulfate (Morphine)  2 mg IVPUSH Q2H PRN


   PRN Reason: Pain (severe 7-10)


   Stop: 02/12/18 05:57


Ondansetron HCl (Zofran Odt)  4 mg PO Q4H PRN


   PRN Reason: nausea, able to take PO


Sodium Chloride (Saline Flush)  10 ml FLUSH ASDIRECTED PRN


   PRN Reason: Keep Vein Open


Sodium Chloride (Saline Flush)  2.5 ml FLUSH ASDIRECTED PRN


   PRN Reason: Keep Vein Open





Discontinued Medications





Albuterol/Ipratropium (Duoneb 3.0-0.5 Mg/3 Ml)  3 ml NEB ONETIME ONE


   Stop: 02/10/18 21:15


   Last Admin: 02/10/18 21:27 Dose:  3 ml


Albuterol/Ipratropium (Duoneb 3.0-0.5 Mg/3 Ml) Confirm Administered Dose 3 ml 

.ROUTE .STK-MED ONE


   Stop: 02/10/18 21:15


   Last Admin: 02/10/18 21:27 Dose:  Not Given


Albuterol/Ipratropium (Duoneb 3.0-0.5 Mg/3 Ml)  3 ml NEB ONETIME ONE


   Stop: 02/10/18 21:52


   Last Admin: 02/10/18 21:56 Dose:  3 ml


Sodium Chloride (Normal Saline)  1,000 mls @ 999 mls/hr IV STAT ONE


   Stop: 02/10/18 22:14


   Last Admin: 02/10/18 21:27 Dose:  999 mls/hr


Ketorolac Tromethamine (Toradol)  30 mg IVPUSH ONETIME ONE


   Stop: 02/10/18 21:58


   Last Admin: 02/10/18 22:02 Dose:  30 mg


Methylprednisolone Sodium Succinate (Solu-Medrol)  125 mg IVPUSH ONETIME ONE


   Stop: 02/10/18 21:15


   Last Admin: 02/10/18 21:27 Dose:  125 mg











- Exam


Quality Assessment: Reports: DVT Prophylaxis


General: Reports: Alert, Oriented, Cooperative, No Acute Distress


HEENT: Reports: Pupils Equal, Pupils Reactive, EOMI, Mucous Membr. Moist/Pink


Neck: Reports: Supple


Lungs: Reports: Clear to Auscultation, Normal Respiratory Effort


Cardiovascular: Reports: Regular Rate, Regular Rhythm, No Murmurs


GI/Abdominal Exam: Normal Bowel Sounds, Soft, Non-Tender, No Organomegaly, No 

Distention, No Abnormal Bruit


 (Male) Exam: Deferred


Rectal (Males) Exam: Deferred


Back Exam: Reports: Normal Inspection


Extremities: Normal Inspection, Non-Tender, No Pedal Edema, Normal Capillary 

Refill


Skin: Reports: Warm, Dry, Intact


Neurological: Reports: No New Focal Deficit


Psy/Mental Status: Reports: Alert, Normal Affect, Normal Mood





*Q Meaningful Use (DIS)





- VTE *Q


VTE Criteria *Q: 








- Stroke *Q


Stroke Criteria *Q: 








- AMI *Q


AMI Criteria *Q:

## 2018-02-12 NOTE — CR
EXAM DATE: 02/10/18



PATIENT'S AGE: 47





Patient: DENI KNOWLES



Facility: [place], [place]

Patient ID: [id number]

Site Patient ID: [id number].

Site Accession #: [id number]

: 1970

Study: XRay Chest [id number]/10/2018 9:49:50 PM

Ordering Physician: Darek Burch



Final Report: 

HISTORY:

Shortness of breath.



TECHNIQUE:

Two views of the chest.



COMPARISON:

2017.



FINDINGS:

Cardiac size and pulmonary vasculature are within normal limits. There is no 
acute lung infiltrate or pulmonary edema. No pneumothorax or pleural effusion. 
No acute bony abnormality.



IMPRESSION:

No acute disease.



Dictated by Grupo Morton MD @ 2/10/2018 9:58:09 PM





Dictated by: Grupo Morton MD @ 02/10/2018 21:58:13

(Electronic Signature)



Report Signed by Proxy.
[facility]

## 2018-03-24 ENCOUNTER — HOSPITAL ENCOUNTER (EMERGENCY)
Dept: HOSPITAL 56 - MW.ED | Age: 48
Discharge: HOME | End: 2018-03-24
Payer: COMMERCIAL

## 2018-03-24 VITALS — SYSTOLIC BLOOD PRESSURE: 171 MMHG | DIASTOLIC BLOOD PRESSURE: 87 MMHG

## 2018-03-24 DIAGNOSIS — J45.21: Primary | ICD-10-CM

## 2018-03-24 DIAGNOSIS — E66.9: ICD-10-CM

## 2018-03-24 LAB
CHLORIDE SERPL-SCNC: 106 MMOL/L (ref 98–107)
SODIUM SERPL-SCNC: 140 MMOL/L (ref 136–148)

## 2018-03-24 PROCEDURE — 36415 COLL VENOUS BLD VENIPUNCTURE: CPT

## 2018-03-24 PROCEDURE — 94640 AIRWAY INHALATION TREATMENT: CPT

## 2018-03-24 PROCEDURE — 85025 COMPLETE CBC W/AUTO DIFF WBC: CPT

## 2018-03-24 PROCEDURE — 99285 EMERGENCY DEPT VISIT HI MDM: CPT

## 2018-03-24 PROCEDURE — 80053 COMPREHEN METABOLIC PANEL: CPT

## 2018-03-24 PROCEDURE — 87804 INFLUENZA ASSAY W/OPTIC: CPT

## 2018-03-24 PROCEDURE — 96374 THER/PROPH/DIAG INJ IV PUSH: CPT

## 2018-03-24 PROCEDURE — 71046 X-RAY EXAM CHEST 2 VIEWS: CPT

## 2018-03-24 NOTE — EDM.PDOC
ED HPI GENERAL MEDICAL PROBLEM





- General


Chief Complaint: Respiratory Problem


Stated Complaint: ASTHMA ATTACK


Time Seen by Provider: 03/24/18 15:48





- History of Present Illness


INITIAL COMMENTS - FREE TEXT/NARRATIVE: 





HISTORY AND PHYSICAL:


[]47-year-old gentleman presenting with increasing asthma /coughing





History of Present Illness:


[]This is been sick for the last 2 days


 using the nebulizer at home





Review of Systems:


As per history of present illness and below otherwise all 


systems reviewed and negative.  





Past medical history:


As per history of present illness and as reviewed below


otherwise noncontributory.





Surgical history:


As per history of present illness and as reviewed below


otherwise noncontributory.





Social history:


No reported history of drug or alcohol abuse.





Family history:


As per history of present illness and as reviewed below


otherwise noncontributory.





Physical exam:


Alert and oriented gentleman wheezing speaking in 4-5 word sentences. Skin is 

warm and dry


HEENT: Atraumatic, normocehpalic, pupils reactive, negative for conjunctival 

pallor or scleral icterus, mucous membranes moist, throat clear, neck supple, 

nontender, trachea midline.  


Lungs: Clear to auscultation, breath sounds equal bilaterally, chest non 

tender.  


Heart: S1S2, regular, negative for clicks, rubs, or JVD.


Abdomen: Soft, nondistended, nontender.  Negative for masses or 

hepatossplenmegaly. Negative for costovertebral tenderness.


Pelvis: Stable nontender.


Genitourinary: Deferred.


Rectal: Deferred


Extremities: Atraumatic, negative for cords or calf pain.  


Neurovascular unremarkable.


Neuro:  Awake, alert, oriented.  Cranial nerves II through XII


unremarkable.  Cerebellum unremarkable.  Motor and sensory unremarkable 

throughout.  Exam nonfocal.  





Diagnostics:


[Chest x-ray CBC CMP flu]





Therapeutics:


[DuoNeb]





Impression:


[]Exacerbation of his asthma





Plan:


[]Discharge home


Follow up with your primary care next week


Return to ER as necessary and as discussed





Definitive disposition and diagnosis as appropriate pending


reevaluation and review of above.  








- Related Data


 Allergies











Allergy/AdvReac Type Severity Reaction Status Date / Time


 


No Known Allergies Allergy   Verified 03/24/18 16:10











Home Meds: 


 Home Meds





Albuterol [Ventolin HFA] 2 inh INH Q4HR PRN 04/03/17 [History]


Albuterol/Ipratropium [DuoNeb 3.0-0.5 MG/3 ML] 1 ampule PO ASDIRECTED 04/03/17 [

History]


Fluticasone/Vilanterol [Breo Ellipta 200-25 Mcg INH] 1 each IH DAILY 12/27/17 [

History]


Losartan/Hydrochlorothiazide [Losartan-HCTZ 50-12.5 MG] 1 each PO BEDTIME 12/27/ 17 [History]


methylPREDNISolone [Medrol] 4 mg PO ASDIRECTED #1 dosepk 03/24/18 [Rx]











Past Medical History


HEENT History: Reports: Impaired Vision


Other HEENT History: Patient reports farsightedness


Cardiovascular History: Reports: Hypertension


Respiratory History: Reports: Asthma


Gastrointestinal History: Reports: None


Genitourinary History: Reports: None, Retention, Urinary


Musculoskeletal History: Reports: None, Gout


Neurological History: Reports: None


Psychiatric History: Reports: None


Endocrine/Metabolic History: Reports: None, Obesity/BMI 30+


Hematologic History: Reports: None


Immunologic History: Reports: None


Oncologic (Cancer) History: Reports: None


Dermatologic History: Reports: None





- Infectious Disease History


Infectious Disease History: Reports: Chicken Pox





- Past Surgical History


HEENT Surgical History: Reports: Naso-Sinus Surgery, Polypectomy


Musculoskeletal Surgical History: Reports: None, Other (See Below)





Social & Family History





- Family History


Family Medical History: Noncontributory


Cardiac: Reports: Hypertension, MI


Respiratory: Reports: Asthma, Other (See Below)


Other Respiratory Family Hisory: Mother had lung CA. She was a heavy smoker.


Oncologic: Reports: Lung





- Tobacco Use


Smoking Status *Q: Never Smoker


Years of Tobacco use: 20


Packs/Tins Daily: 1


Second Hand Smoke Exposure: No





- Caffeine Use


Caffeine Use: Reports: Coffee


Caffeine Use Comment: Occasional coffee drinker





- Alcohol Use


Days Per Week of Alcohol Use: 0


Number of Drinks Per Day: 0


Total Drinks Per Week: 0





- Recreational Drug Use


Recreational Drug Use: No





- Living Situation & Occupation


Living situation: Reports: 


Occupation: Employed





ED ROS GENERAL





- Review of Systems


Review Of Systems: ROS reveals no pertinent complaints other than HPI.





ED EXAM, GENERAL





- Physical Exam


Exam: See Below (see dictation)





Course





- Vital Signs


Last Recorded V/S: 





 Last Vital Signs











Temp  36.9 C   03/24/18 16:03


 


Pulse  94   03/24/18 16:03


 


Resp  28 H  03/24/18 16:03


 


BP  189/91 H  03/24/18 16:03


 


Pulse Ox  94 L  03/24/18 16:03














- Orders/Labs/Meds


Orders: 





 Active Orders 24 hr











 Category Date Time Status


 


 RT Aerosol Therapy [RC] ASDIRECTED Care  03/24/18 16:09 Active


 


 Chest 2V [CR] Stat Exams  03/24/18 16:09 Taken


 


 Sodium Chloride 0.9% [Saline Flush] Med  03/24/18 16:13 Active





 10 ml FLUSH ASDIRECTED PRN   


 


 Sodium Chloride 0.9% [Saline Flush] Med  03/24/18 16:13 Active





 2.5 ml FLUSH ASDIRECTED PRN   


 


 Saline Lock Insert [OM.PC] Stat Oth  03/24/18 16:13 Ordered








 Medication Orders





Sodium Chloride (Saline Flush)  10 ml FLUSH ASDIRECTED PRN


   PRN Reason: Keep Vein Open


Sodium Chloride (Saline Flush)  2.5 ml FLUSH ASDIRECTED PRN


   PRN Reason: Keep Vein Open








Labs: 





 Laboratory Tests











  03/24/18 03/24/18 Range/Units





  16:23 16:23 


 


WBC  8.34   (4.0-11.0)  K/uL


 


RBC  4.56   (4.50-5.90)  M/uL


 


Hgb  14.3   (13.0-17.0)  g/dL


 


Hct  40.4   (38.0-50.0)  %


 


MCV  88.6   (80.0-98.0)  fL


 


MCH  31.4   (27.0-32.0)  pg


 


MCHC  35.4   (31.0-37.0)  g/dL


 


RDW Std Deviation  45.9   (28.0-62.0)  fl


 


RDW Coeff of Walter  14   (11.0-15.0)  %


 


Plt Count  279   (150-400)  K/uL


 


MPV  9.20   (7.40-12.00)  fL


 


Neut % (Auto)  59.8   (48.0-80.0)  %


 


Lymph % (Auto)  18.7   (16.0-40.0)  %


 


Mono % (Auto)  6.7   (0.0-15.0)  %


 


Eos % (Auto)  14.3 H   (0.0-7.0)  %


 


Baso % (Auto)  0.5   (0.0-1.5)  %


 


Neut # (Auto)  5.0   (1.4-5.7)  K/uL


 


Lymph # (Auto)  1.6   (0.6-2.4)  K/uL


 


Mono # (Auto)  0.6   (0.0-0.8)  K/uL


 


Eos # (Auto)  1.2 H   (0.0-0.7)  K/uL


 


Baso # (Auto)  0.0   (0.0-0.1)  K/uL


 


Nucleated RBC %  0.0   /100WBC


 


Nucleated RBCs #  0   K/uL


 


Sodium   140  (136-148)  mmol/L


 


Potassium   3.3 L  (3.5-5.1)  mmol/L


 


Chloride   106  ()  mmol/L


 


Carbon Dioxide   24.4  (21.0-32.0)  mmol/L


 


BUN   18  (7.0-18.0)  mg/dL


 


Creatinine   1.1  (0.8-1.3)  mg/dL


 


Est Cr Clr Drug Dosing   TNP  


 


Estimated GFR (MDRD)   > 60.0  ml/min


 


Glucose   100  ()  mg/dL


 


Calcium   9.0  (8.5-10.1)  mg/dL


 


Total Bilirubin   1.2 H  (0.2-1.0)  mg/dL


 


AST   27  (15-37)  IU/L


 


ALT   47  (14-63)  IU/L


 


Alkaline Phosphatase   101  ()  U/L


 


Total Protein   6.9  (6.4-8.2)  g/dL


 


Albumin   3.2 L  (3.4-5.0)  g/dL


 


Globulin   3.7 H  (2.0-3.5)  g/dL


 


Albumin/Globulin Ratio   0.9 L  (1.3-2.8)  











Meds: 





Medications











Generic Name Dose Route Start Last Admin





  Trade Name Freq  PRN Reason Stop Dose Admin


 


Sodium Chloride  10 ml  03/24/18 16:13  





  Saline Flush  FLUSH   





  ASDIRECTED PRN   





  Keep Vein Open   


 


Sodium Chloride  2.5 ml  03/24/18 16:13  





  Saline Flush  FLUSH   





  ASDIRECTED PRN   





  Keep Vein Open   














Discontinued Medications














Generic Name Dose Route Start Last Admin





  Trade Name Freq  PRN Reason Stop Dose Admin


 


Albuterol/Ipratropium  3 ml  03/24/18 16:09  03/24/18 16:18





  Duoneb 3.0-0.5 Mg/3 Ml  NEB  03/24/18 16:10  3 ml





  ONETIME ONE   Administration


 


Methylprednisolone Sodium Succinate  125 mg  03/24/18 16:09  03/24/18 16:21





  Solu-Medrol  IVPUSH  03/24/18 16:10  125 mg





  ONETIME ONE   Administration














Departure





- Departure


Time of Disposition: 17:09


Disposition: Home, Self-Care 01


Condition: Good


Clinical Impression: 


Exacerbation of asthma


Qualifiers:


 Asthma severity: mild Asthma persistence: intermittent Qualified Code(s): 

J45.21 - Mild intermittent asthma with (acute) exacerbation








- Discharge Information


Prescriptions: 


methylPREDNISolone [Medrol] 4 mg PO ASDIRECTED #1 dosepk


Instructions:  Asthma, Adult


Referrals: 


PCP,None [Primary Care Provider] - 


Additional Instructions: 


The following information is given to patients seen in the emergency department 

who are being discharged to home. This information is to outline your options 

for follow-up care. We provide all patients seen in our emergency department 

with a follow-up referral.





The need for follow-up, as well as the timing and circumstances, are variable 

depending upon the specifics of your emergency department visit.





If you don't have a primary care physician on staff, we will provide you with a 

referral. We always advise you to contact your personal physician following an 

emergency department visit to inform them of the circumstance of the visit and 

for follow-up with them and/or the need for any referrals to a consulting 

specialist.





The emergency department will also refer you to a specialist when appropriate. 

This referral assures that you have the opportunity for followup care with a 

specialist. All of these measure are taken in an effort to provide you with 

optimal care, which includes your followup.





Under all circumstances we always encourage you to contact your private 

physician who remains a resource for coordinating  your care. When calling for 

followup care, please make the office aware that this follow-up is from your 

recent emergency room visit. If for any reason you are refused follow-up, 

please contact the [facility] emergency department at [phone] 

and asked to speak to the emergency department charge nurse.





You had a respiratory treatment with ipratropium


No pneumonia was noted on your x-ray


Laboratory values were negative for infection and influenza


Follow-up with your primary care provider next week for reevaluation


Prescription for Medrol Dosepak was sent to your pharmacy chronically


Return to the emergency room as needed as discussed and directed





- My Orders


Last 24 Hours: 





My Active Orders





03/24/18 16:09


RT Aerosol Therapy [RC] ASDIRECTED 


Chest 2V [CR] Stat 





03/24/18 16:13


Sodium Chloride 0.9% [Saline Flush]   10 ml FLUSH ASDIRECTED PRN 


Sodium Chloride 0.9% [Saline Flush]   2.5 ml FLUSH ASDIRECTED PRN 


Saline Lock Insert [OM.PC] Stat 














- Assessment/Plan


Last 24 Hours: 





My Active Orders





03/24/18 16:09


RT Aerosol Therapy [RC] ASDIRECTED 


Chest 2V [CR] Stat 





03/24/18 16:13


Sodium Chloride 0.9% [Saline Flush]   10 ml FLUSH ASDIRECTED PRN 


Sodium Chloride 0.9% [Saline Flush]   2.5 ml FLUSH ASDIRECTED PRN 


Saline Lock Insert [OM.PC] Stat

## 2018-03-26 NOTE — CR
EXAM DATE: 18



PATIENT'S AGE: 47





Patient: DENI KNOWLES



Facility: [place], [place]

Patient ID: [id number]

Site Patient ID: [id number].

Site Accession #: [id number]

: 1970

Study: XRay Chest [id number]/24/2018 4:54:04 PM

Ordering Physician: Doctor Temp



Final Report: 

INDICATION:

Asthma, pain, shortness of breath.



TECHNIQUE:

Upright PA and lateral views.



FINDINGS:

The heart mediastinum and pulmonary vessels are within normal limits. Slightly 
increased perihilar density, nonspecific. No localized alveolar opacity. No 
focal alveolar infiltrate. There are no acute/regressive osseous lesions.



IMPRESSION:

No acute findings.





Dictated by Deny Sanchez MD @ Mar 24 2018 4:56PM

(Electronic Signature)



Report Signed by Proxy.
JACKSON